# Patient Record
Sex: MALE | Race: WHITE | Employment: OTHER | URBAN - METROPOLITAN AREA
[De-identification: names, ages, dates, MRNs, and addresses within clinical notes are randomized per-mention and may not be internally consistent; named-entity substitution may affect disease eponyms.]

---

## 2017-04-18 ENCOUNTER — HOSPITAL ENCOUNTER (OUTPATIENT)
Age: 67
Discharge: HOME | End: 2017-04-18
Payer: MEDICARE

## 2017-04-18 LAB
ESTIMATED AVERAGE GLUCOSE: 114 MG/DL
HBA1C MFR BLD: 5.6 % (ref 4–6)

## 2017-04-18 PROCEDURE — 83036 HEMOGLOBIN GLYCOSYLATED A1C: CPT

## 2017-04-18 PROCEDURE — 36415 COLL VENOUS BLD VENIPUNCTURE: CPT

## 2017-05-11 ENCOUNTER — HOSPITAL ENCOUNTER (OUTPATIENT)
Dept: CT IMAGING | Age: 67
Discharge: HOME | End: 2017-05-11
Payer: MEDICARE

## 2017-05-11 DIAGNOSIS — R91.1 NODULE OF APEX OF RIGHT LUNG: ICD-10-CM

## 2017-05-11 PROCEDURE — 71250 CT THORAX DX C-: CPT

## 2017-05-20 ENCOUNTER — APPOINTMENT (OUTPATIENT)
Dept: CT IMAGING | Age: 67
End: 2017-05-20
Payer: MEDICARE

## 2017-05-20 ENCOUNTER — HOSPITAL ENCOUNTER (EMERGENCY)
Age: 67
Discharge: LEFT BEFORE DISCHARGE | End: 2017-05-20
Attending: EMERGENCY MEDICINE
Payer: MEDICARE

## 2017-05-20 ENCOUNTER — APPOINTMENT (OUTPATIENT)
Dept: GENERAL RADIOLOGY | Age: 67
End: 2017-05-20
Payer: MEDICARE

## 2017-05-20 VITALS
DIASTOLIC BLOOD PRESSURE: 74 MMHG | RESPIRATION RATE: 14 BRPM | HEART RATE: 85 BPM | SYSTOLIC BLOOD PRESSURE: 121 MMHG | TEMPERATURE: 97.9 F | OXYGEN SATURATION: 96 %

## 2017-05-20 DIAGNOSIS — R55 SYNCOPE AND COLLAPSE: Primary | ICD-10-CM

## 2017-05-20 DIAGNOSIS — F10.920 ACUTE ALCOHOLIC INTOXICATION, UNCOMPLICATED (HCC): ICD-10-CM

## 2017-05-20 LAB
ABSOLUTE EOS #: 0.2 K/UL (ref 0–0.4)
ABSOLUTE LYMPH #: 1.7 K/UL (ref 1–4.8)
ABSOLUTE MONO #: 0.9 K/UL (ref 0.1–1.2)
ACETAMINOPHEN LEVEL: <10 UG/ML (ref 10–30)
AMPHETAMINE SCREEN URINE: NEGATIVE
ANION GAP SERPL CALCULATED.3IONS-SCNC: 18 MMOL/L (ref 9–17)
BARBITURATE SCREEN URINE: NEGATIVE
BASOPHILS # BLD: 1 %
BASOPHILS ABSOLUTE: 0.1 K/UL (ref 0–0.2)
BENZODIAZEPINE SCREEN, URINE: NEGATIVE
BILIRUBIN URINE: NEGATIVE
BUN BLDV-MCNC: 7 MG/DL (ref 8–23)
BUN/CREAT BLD: ABNORMAL (ref 9–20)
BUPRENORPHINE URINE: NORMAL
CALCIUM SERPL-MCNC: 9.2 MG/DL (ref 8.6–10.4)
CANNABINOID SCREEN URINE: NEGATIVE
CHLORIDE BLD-SCNC: 97 MMOL/L (ref 98–107)
CO2: 17 MMOL/L (ref 20–31)
COCAINE METABOLITE, URINE: NEGATIVE
COLOR: YELLOW
COMMENT UA: ABNORMAL
CREAT SERPL-MCNC: 0.74 MG/DL (ref 0.7–1.2)
DIFFERENTIAL TYPE: ABNORMAL
EOSINOPHILS RELATIVE PERCENT: 2 %
ETHANOL PERCENT: 0.17 %
ETHANOL: 166 MG/DL
GFR AFRICAN AMERICAN: >60 ML/MIN
GFR NON-AFRICAN AMERICAN: >60 ML/MIN
GFR SERPL CREATININE-BSD FRML MDRD: ABNORMAL ML/MIN/{1.73_M2}
GFR SERPL CREATININE-BSD FRML MDRD: ABNORMAL ML/MIN/{1.73_M2}
GLUCOSE BLD-MCNC: 88 MG/DL (ref 70–99)
GLUCOSE URINE: NEGATIVE
HCT VFR BLD CALC: 49.8 % (ref 41–53)
HEMOGLOBIN: 17.3 G/DL (ref 13.5–17.5)
KETONES, URINE: NEGATIVE
LEUKOCYTE ESTERASE, URINE: NEGATIVE
LYMPHOCYTES # BLD: 15 %
MCH RBC QN AUTO: 31.9 PG (ref 26–34)
MCHC RBC AUTO-ENTMCNC: 34.7 G/DL (ref 31–37)
MCV RBC AUTO: 92 FL (ref 80–100)
MDMA URINE: NORMAL
METHADONE SCREEN, URINE: NEGATIVE
METHAMPHETAMINE, URINE: NORMAL
MONOCYTES # BLD: 8 %
NITRITE, URINE: NEGATIVE
OPIATES, URINE: NEGATIVE
OXYCODONE SCREEN URINE: NEGATIVE
PDW BLD-RTO: 15.3 % (ref 12.5–15.4)
PH UA: 5.5 (ref 5–8)
PHENCYCLIDINE, URINE: NEGATIVE
PLATELET # BLD: ABNORMAL K/UL (ref 140–450)
PLATELET ESTIMATE: ABNORMAL
PMV BLD AUTO: ABNORMAL FL (ref 6–12)
POC TROPONIN I: 0 NG/ML (ref 0–0.1)
POC TROPONIN INTERP: NORMAL
POTASSIUM SERPL-SCNC: 4.5 MMOL/L (ref 3.7–5.3)
PROPOXYPHENE, URINE: NORMAL
PROTEIN UA: NEGATIVE
RBC # BLD: 5.42 M/UL (ref 4.5–5.9)
RBC # BLD: ABNORMAL 10*6/UL
SALICYLATE LEVEL: <1 MG/DL (ref 3–10)
SEG NEUTROPHILS: 74 %
SEGMENTED NEUTROPHILS ABSOLUTE COUNT: 8.2 K/UL (ref 1.8–7.7)
SODIUM BLD-SCNC: 132 MMOL/L (ref 135–144)
SPECIFIC GRAVITY UA: 1 (ref 1–1.03)
TEST INFORMATION: NORMAL
TOXIC TRICYCLIC SC,BLOOD: NEGATIVE
TRICYCLIC ANTIDEPRESSANTS, UR: NORMAL
TURBIDITY: CLEAR
URINE HGB: NEGATIVE
UROBILINOGEN, URINE: NORMAL
WBC # BLD: 11.2 K/UL (ref 3.5–11)
WBC # BLD: ABNORMAL 10*3/UL

## 2017-05-20 PROCEDURE — 2580000003 HC RX 258: Performed by: EMERGENCY MEDICINE

## 2017-05-20 PROCEDURE — 85025 COMPLETE CBC W/AUTO DIFF WBC: CPT

## 2017-05-20 PROCEDURE — 93005 ELECTROCARDIOGRAM TRACING: CPT

## 2017-05-20 PROCEDURE — 81003 URINALYSIS AUTO W/O SCOPE: CPT

## 2017-05-20 PROCEDURE — 71020 XR CHEST STANDARD TWO VW: CPT

## 2017-05-20 PROCEDURE — 70450 CT HEAD/BRAIN W/O DYE: CPT

## 2017-05-20 PROCEDURE — 99285 EMERGENCY DEPT VISIT HI MDM: CPT

## 2017-05-20 PROCEDURE — 80048 BASIC METABOLIC PNL TOTAL CA: CPT

## 2017-05-20 PROCEDURE — G0480 DRUG TEST DEF 1-7 CLASSES: HCPCS

## 2017-05-20 PROCEDURE — 84484 ASSAY OF TROPONIN QUANT: CPT

## 2017-05-20 PROCEDURE — 80307 DRUG TEST PRSMV CHEM ANLYZR: CPT

## 2017-05-20 RX ORDER — LISINOPRIL 20 MG/1
30 TABLET ORAL DAILY
COMMUNITY
End: 2017-09-26 | Stop reason: SDUPTHER

## 2017-05-20 RX ORDER — 0.9 % SODIUM CHLORIDE 0.9 %
1000 INTRAVENOUS SOLUTION INTRAVENOUS ONCE
Status: COMPLETED | OUTPATIENT
Start: 2017-05-20 | End: 2017-05-20

## 2017-05-20 RX ADMIN — SODIUM CHLORIDE 1000 ML: 9 INJECTION, SOLUTION INTRAVENOUS at 19:24

## 2017-05-20 ASSESSMENT — ENCOUNTER SYMPTOMS
EYE DISCHARGE: 0
SHORTNESS OF BREATH: 0
VOMITING: 0
RHINORRHEA: 0
NAUSEA: 0
SORE THROAT: 0
ABDOMINAL PAIN: 0

## 2017-05-22 ENCOUNTER — HOSPITAL ENCOUNTER (OUTPATIENT)
Age: 67
Discharge: HOME | End: 2017-05-22
Payer: MEDICARE

## 2017-05-22 LAB
-: ABNORMAL
AMORPHOUS: ABNORMAL
BACTERIA: ABNORMAL
BILIRUBIN URINE: NEGATIVE
CASTS UA: ABNORMAL /LPF
COLOR: YELLOW
COMMENT UA: ABNORMAL
CRYSTALS, UA: ABNORMAL /HPF
EKG ATRIAL RATE: 91 BPM
EKG P AXIS: 43 DEGREES
EKG P-R INTERVAL: 130 MS
EKG Q-T INTERVAL: 358 MS
EKG QRS DURATION: 80 MS
EKG QTC CALCULATION (BAZETT): 440 MS
EKG R AXIS: -8 DEGREES
EKG T AXIS: 35 DEGREES
EKG VENTRICULAR RATE: 91 BPM
EPITHELIAL CELLS UA: ABNORMAL /HPF
GLUCOSE URINE: NEGATIVE
KETONES, URINE: NEGATIVE
LEUKOCYTE ESTERASE, URINE: NEGATIVE
MUCUS: ABNORMAL
NITRITE, URINE: NEGATIVE
OTHER OBSERVATIONS UA: ABNORMAL
PH UA: 5.5 (ref 5–8)
POC TROPONIN I: 0 NG/ML (ref 0–0.1)
POC TROPONIN INTERP: NORMAL
PROTEIN UA: NEGATIVE
RBC UA: ABNORMAL /HPF
RENAL EPITHELIAL, UA: ABNORMAL /HPF
SPECIFIC GRAVITY UA: 1.01 (ref 1–1.03)
TRICHOMONAS: ABNORMAL
TURBIDITY: CLEAR
URINE HGB: NEGATIVE
UROBILINOGEN, URINE: NORMAL
WBC UA: ABNORMAL /HPF
YEAST: ABNORMAL

## 2017-05-22 PROCEDURE — 81001 URINALYSIS AUTO W/SCOPE: CPT

## 2017-06-23 ENCOUNTER — HOSPITAL ENCOUNTER (OUTPATIENT)
Age: 67
Discharge: HOME | End: 2017-06-23
Payer: MEDICARE

## 2017-06-23 LAB
ANION GAP SERPL CALCULATED.3IONS-SCNC: 12 MMOL/L (ref 9–17)
BUN BLDV-MCNC: 11 MG/DL (ref 8–23)
BUN/CREAT BLD: ABNORMAL (ref 9–20)
CALCIUM SERPL-MCNC: 9.4 MG/DL (ref 8.6–10.4)
CHLORIDE BLD-SCNC: 101 MMOL/L (ref 98–107)
CO2: 24 MMOL/L (ref 20–31)
CREAT SERPL-MCNC: 0.91 MG/DL (ref 0.7–1.2)
GFR AFRICAN AMERICAN: >60 ML/MIN
GFR NON-AFRICAN AMERICAN: >60 ML/MIN
GFR SERPL CREATININE-BSD FRML MDRD: ABNORMAL ML/MIN/{1.73_M2}
GFR SERPL CREATININE-BSD FRML MDRD: ABNORMAL ML/MIN/{1.73_M2}
GLUCOSE BLD-MCNC: 109 MG/DL (ref 70–99)
POTASSIUM SERPL-SCNC: 4.9 MMOL/L (ref 3.7–5.3)
SODIUM BLD-SCNC: 137 MMOL/L (ref 135–144)

## 2017-06-23 PROCEDURE — 36415 COLL VENOUS BLD VENIPUNCTURE: CPT

## 2017-06-23 PROCEDURE — 80048 BASIC METABOLIC PNL TOTAL CA: CPT

## 2017-07-25 ENCOUNTER — HOSPITAL ENCOUNTER (OUTPATIENT)
Age: 67
Discharge: HOME | End: 2017-07-25
Payer: MEDICARE

## 2017-07-25 LAB
ANION GAP SERPL CALCULATED.3IONS-SCNC: 13 MMOL/L (ref 9–17)
BUN BLDV-MCNC: 10 MG/DL (ref 8–23)
BUN/CREAT BLD: ABNORMAL (ref 9–20)
CALCIUM SERPL-MCNC: 9.3 MG/DL (ref 8.6–10.4)
CHLORIDE BLD-SCNC: 102 MMOL/L (ref 98–107)
CO2: 24 MMOL/L (ref 20–31)
CREAT SERPL-MCNC: 0.8 MG/DL (ref 0.7–1.2)
GFR AFRICAN AMERICAN: >60 ML/MIN
GFR NON-AFRICAN AMERICAN: >60 ML/MIN
GFR SERPL CREATININE-BSD FRML MDRD: ABNORMAL ML/MIN/{1.73_M2}
GFR SERPL CREATININE-BSD FRML MDRD: ABNORMAL ML/MIN/{1.73_M2}
GLUCOSE BLD-MCNC: 126 MG/DL (ref 70–99)
POTASSIUM SERPL-SCNC: 5.1 MMOL/L (ref 3.7–5.3)
SODIUM BLD-SCNC: 139 MMOL/L (ref 135–144)

## 2017-07-25 PROCEDURE — 80048 BASIC METABOLIC PNL TOTAL CA: CPT

## 2017-07-25 PROCEDURE — 36415 COLL VENOUS BLD VENIPUNCTURE: CPT

## 2017-07-26 ENCOUNTER — HOSPITAL ENCOUNTER (OUTPATIENT)
Dept: VASCULAR LAB | Age: 67
Discharge: HOME | End: 2017-07-26
Payer: MEDICARE

## 2017-07-26 PROCEDURE — 93923 UPR/LXTR ART STDY 3+ LVLS: CPT

## 2017-08-11 ENCOUNTER — APPOINTMENT (OUTPATIENT)
Dept: CT IMAGING | Age: 67
DRG: 958 | End: 2017-08-11
Payer: MEDICARE

## 2017-08-11 ENCOUNTER — APPOINTMENT (OUTPATIENT)
Dept: GENERAL RADIOLOGY | Age: 67
DRG: 958 | End: 2017-08-11
Payer: MEDICARE

## 2017-08-11 ENCOUNTER — HOSPITAL ENCOUNTER (INPATIENT)
Age: 67
LOS: 5 days | Discharge: HOME HEALTH | DRG: 958 | End: 2017-08-17
Attending: EMERGENCY MEDICINE | Admitting: INTERNAL MEDICINE
Payer: MEDICARE

## 2017-08-11 DIAGNOSIS — S14.129A CENTRAL CORD SYNDROME, INITIAL ENCOUNTER (HCC): Primary | ICD-10-CM

## 2017-08-11 DIAGNOSIS — S01.01XA LACERATION OF SCALP WITHOUT FOREIGN BODY, INITIAL ENCOUNTER: ICD-10-CM
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
## 2017-08-11 DIAGNOSIS — F10.920 ALCOHOL INTOXICATION, UNCOMPLICATED (HCC): ICD-10-CM

## 2017-08-11 LAB
ABO/RH: NORMAL
ANION GAP SERPL CALCULATED.3IONS-SCNC: 20 MMOL/L (ref 9–17)
ANTIBODY SCREEN: NEGATIVE
ARM BAND NUMBER: NORMAL
BLOOD BANK SPECIMEN: ABNORMAL
BUN BLDV-MCNC: 11 MG/DL (ref 8–23)
CHLORIDE BLD-SCNC: 94 MMOL/L (ref 98–107)
CO2: 15 MMOL/L (ref 20–31)
CREAT SERPL-MCNC: 0.81 MG/DL (ref 0.7–1.2)
ETHANOL PERCENT: 0.27 %
ETHANOL: 268 MG/DL
EXPIRATION DATE: NORMAL
GFR AFRICAN AMERICAN: >60 ML/MIN
GFR NON-AFRICAN AMERICAN: >60 ML/MIN
GFR SERPL CREATININE-BSD FRML MDRD: ABNORMAL ML/MIN/{1.73_M2}
GFR SERPL CREATININE-BSD FRML MDRD: ABNORMAL ML/MIN/{1.73_M2}
GLUCOSE BLD-MCNC: 103 MG/DL (ref 70–99)
HCT VFR BLD CALC: 42.5 % (ref 41–53)
HEMOGLOBIN: 14.5 G/DL (ref 13.5–17.5)
INR BLD: 1
MCH RBC QN AUTO: 31.5 PG (ref 26–34)
MCHC RBC AUTO-ENTMCNC: 34.1 G/DL (ref 31–37)
MCV RBC AUTO: 92.4 FL (ref 80–100)
MYOGLOBIN: 378 NG/ML (ref 28–72)
PARTIAL THROMBOPLASTIN TIME: 24.3 SEC (ref 21.3–31.3)
PDW BLD-RTO: 14.2 % (ref 12.5–15.4)
PLATELET # BLD: 222 K/UL (ref 140–450)
PMV BLD AUTO: 7.9 FL (ref 6–12)
POTASSIUM SERPL-SCNC: 4.1 MMOL/L (ref 3.7–5.3)
PROTHROMBIN TIME: 10.7 SEC (ref 9.4–12.6)
RBC # BLD: 4.6 M/UL (ref 4.5–5.9)
SODIUM BLD-SCNC: 129 MMOL/L (ref 135–144)
TOTAL CK: 133 U/L (ref 39–308)
TROPONIN INTERP: ABNORMAL
TROPONIN T: <0.03 NG/ML
WBC # BLD: 10.4 K/UL (ref 3.5–11)

## 2017-08-11 PROCEDURE — 72170 X-RAY EXAM OF PELVIS: CPT

## 2017-08-11 PROCEDURE — 84484 ASSAY OF TROPONIN QUANT: CPT

## 2017-08-11 PROCEDURE — 99285 EMERGENCY DEPT VISIT HI MDM: CPT

## 2017-08-11 PROCEDURE — 72125 CT NECK SPINE W/O DYE: CPT

## 2017-08-11 PROCEDURE — 80307 DRUG TEST PRSMV CHEM ANLYZR: CPT

## 2017-08-11 PROCEDURE — 86850 RBC ANTIBODY SCREEN: CPT

## 2017-08-11 PROCEDURE — G0390 TRAUMA RESPONS W/HOSP CRITI: HCPCS

## 2017-08-11 PROCEDURE — 85730 THROMBOPLASTIN TIME PARTIAL: CPT

## 2017-08-11 PROCEDURE — 84520 ASSAY OF UREA NITROGEN: CPT

## 2017-08-11 PROCEDURE — 70450 CT HEAD/BRAIN W/O DYE: CPT

## 2017-08-11 PROCEDURE — 93005 ELECTROCARDIOGRAM TRACING: CPT

## 2017-08-11 PROCEDURE — 83874 ASSAY OF MYOGLOBIN: CPT

## 2017-08-11 PROCEDURE — 82550 ASSAY OF CK (CPK): CPT

## 2017-08-11 PROCEDURE — 82805 BLOOD GASES W/O2 SATURATION: CPT

## 2017-08-11 PROCEDURE — 85610 PROTHROMBIN TIME: CPT

## 2017-08-11 PROCEDURE — 72128 CT CHEST SPINE W/O DYE: CPT

## 2017-08-11 PROCEDURE — 85390 FIBRINOLYSINS SCREEN I&R: CPT

## 2017-08-11 PROCEDURE — 82565 ASSAY OF CREATININE: CPT

## 2017-08-11 PROCEDURE — 86901 BLOOD TYPING SEROLOGIC RH(D): CPT

## 2017-08-11 PROCEDURE — 72131 CT LUMBAR SPINE W/O DYE: CPT

## 2017-08-11 PROCEDURE — 82947 ASSAY GLUCOSE BLOOD QUANT: CPT

## 2017-08-11 PROCEDURE — 85027 COMPLETE CBC AUTOMATED: CPT

## 2017-08-11 PROCEDURE — G0480 DRUG TEST DEF 1-7 CLASSES: HCPCS

## 2017-08-11 PROCEDURE — 80051 ELECTROLYTE PANEL: CPT

## 2017-08-11 PROCEDURE — G0378 HOSPITAL OBSERVATION PER HR: HCPCS

## 2017-08-11 PROCEDURE — 71010 XR CHEST PORTABLE: CPT

## 2017-08-11 PROCEDURE — 86900 BLOOD TYPING SEROLOGIC ABO: CPT

## 2017-08-11 PROCEDURE — 85210 CLOT FACTOR II PROTHROM SPEC: CPT

## 2017-08-11 PROCEDURE — 36415 COLL VENOUS BLD VENIPUNCTURE: CPT

## 2017-08-12 ENCOUNTER — APPOINTMENT (OUTPATIENT)
Dept: MRI IMAGING | Age: 67
DRG: 958 | End: 2017-08-12
Payer: MEDICARE

## 2017-08-12 ENCOUNTER — APPOINTMENT (OUTPATIENT)
Dept: GENERAL RADIOLOGY | Age: 67
DRG: 958 | End: 2017-08-12
Payer: MEDICARE
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
## 2017-08-12 PROBLEM — R93.89 MEDIASTINAL WIDENING: Status: ACTIVE | Noted: 2017-08-12

## 2017-08-12 PROBLEM — S12.9XXA CERVICAL TRANSVERSE PROCESS FRACTURE (HCC): Status: ACTIVE | Noted: 2017-08-12

## 2017-08-12 PROBLEM — S00.01XA ABRASION OF SCALP: Status: ACTIVE | Noted: 2017-08-12

## 2017-08-12 PROBLEM — E87.1 HYPONATREMIA: Status: ACTIVE | Noted: 2017-08-12

## 2017-08-12 PROBLEM — F10.929 ALCOHOL INTOXICATION (HCC): Status: ACTIVE | Noted: 2017-08-12

## 2017-08-12 PROBLEM — I10 UNCONTROLLED HYPERTENSION: Status: ACTIVE | Noted: 2017-08-12

## 2017-08-12 LAB
ACT TEG: 97 SEC (ref 86–118)
ANGLE, RAPID TEG: 73.8 DEG (ref 64–80)
EPL-TEG: 1.6 % (ref 0–15)
ETHANOL PERCENT: 0.04 %
ETHANOL: 45 MG/DL
HEPARIN THERAPY: NORMAL
KINETICS RAPID TEG: 1.3 MIN (ref 1–2)
LY30 (LYSIS) TEG: 1.6 % (ref 0–8)
MA(MAX CLOT) RAPID TEG: 63.4 MM (ref 52–71)
REACTION TIME RAPID TEG: 0.5 MIN (ref 0–1)
TEG COMMENT: NORMAL
TROPONIN INTERP: NORMAL
TROPONIN INTERP: NORMAL
TROPONIN T: <0.03 NG/ML
TROPONIN T: <0.03 NG/ML

## 2017-08-12 PROCEDURE — 6360000002 HC RX W HCPCS: Performed by: PHYSICIAN ASSISTANT

## 2017-08-12 PROCEDURE — 6370000000 HC RX 637 (ALT 250 FOR IP): Performed by: FAMILY MEDICINE

## 2017-08-12 PROCEDURE — G0480 DRUG TEST DEF 1-7 CLASSES: HCPCS

## 2017-08-12 PROCEDURE — 6360000002 HC RX W HCPCS: Performed by: NURSE PRACTITIONER

## 2017-08-12 PROCEDURE — 94762 N-INVAS EAR/PLS OXIMTRY CONT: CPT

## 2017-08-12 PROCEDURE — 6360000002 HC RX W HCPCS

## 2017-08-12 PROCEDURE — 36415 COLL VENOUS BLD VENIPUNCTURE: CPT

## 2017-08-12 PROCEDURE — 2580000003 HC RX 258: Performed by: NURSE PRACTITIONER

## 2017-08-12 PROCEDURE — 96374 THER/PROPH/DIAG INJ IV PUSH: CPT

## 2017-08-12 PROCEDURE — 2580000003 HC RX 258: Performed by: EMERGENCY MEDICINE

## 2017-08-12 PROCEDURE — 6370000000 HC RX 637 (ALT 250 FOR IP): Performed by: NURSE PRACTITIONER

## 2017-08-12 PROCEDURE — 2060000000 HC ICU INTERMEDIATE R&B

## 2017-08-12 PROCEDURE — 93005 ELECTROCARDIOGRAM TRACING: CPT

## 2017-08-12 PROCEDURE — 99223 1ST HOSP IP/OBS HIGH 75: CPT | Performed by: NEUROLOGICAL SURGERY

## 2017-08-12 PROCEDURE — 99223 1ST HOSP IP/OBS HIGH 75: CPT | Performed by: FAMILY MEDICINE

## 2017-08-12 PROCEDURE — 84484 ASSAY OF TROPONIN QUANT: CPT

## 2017-08-12 PROCEDURE — 73090 X-RAY EXAM OF FOREARM: CPT

## 2017-08-12 PROCEDURE — 73030 X-RAY EXAM OF SHOULDER: CPT

## 2017-08-12 PROCEDURE — 72141 MRI NECK SPINE W/O DYE: CPT

## 2017-08-12 RX ORDER — SODIUM CHLORIDE 0.9 % (FLUSH) 0.9 %
10 SYRINGE (ML) INJECTION PRN
Status: DISCONTINUED | OUTPATIENT
Start: 2017-08-12 | End: 2017-08-17 | Stop reason: HOSPADM

## 2017-08-12 RX ORDER — LORAZEPAM 2 MG/ML
2 INJECTION INTRAMUSCULAR
Status: DISCONTINUED | OUTPATIENT
Start: 2017-08-12 | End: 2017-08-17 | Stop reason: HOSPADM

## 2017-08-12 RX ORDER — 0.9 % SODIUM CHLORIDE 0.9 %
250 INTRAVENOUS SOLUTION INTRAVENOUS ONCE
Status: DISCONTINUED | OUTPATIENT
Start: 2017-08-12 | End: 2017-08-12

## 2017-08-12 RX ORDER — SODIUM CHLORIDE 0.9 % (FLUSH) 0.9 %
10 SYRINGE (ML) INJECTION EVERY 12 HOURS SCHEDULED
Status: DISCONTINUED | OUTPATIENT
Start: 2017-08-12 | End: 2017-08-17 | Stop reason: HOSPADM

## 2017-08-12 RX ORDER — AMLODIPINE BESYLATE 10 MG/1
10 TABLET ORAL DAILY
COMMUNITY

## 2017-08-12 RX ORDER — LORAZEPAM 1 MG/1
2 TABLET ORAL
Status: DISCONTINUED | OUTPATIENT
Start: 2017-08-12 | End: 2017-08-17 | Stop reason: HOSPADM

## 2017-08-12 RX ORDER — LORAZEPAM 1 MG/1
1 TABLET ORAL
Status: DISCONTINUED | OUTPATIENT
Start: 2017-08-12 | End: 2017-08-17 | Stop reason: HOSPADM

## 2017-08-12 RX ORDER — LISINOPRIL 40 MG/1
40 TABLET ORAL DAILY
COMMUNITY

## 2017-08-12 RX ORDER — MORPHINE SULFATE 4 MG/ML
4 INJECTION, SOLUTION INTRAMUSCULAR; INTRAVENOUS
Status: DISCONTINUED | OUTPATIENT
Start: 2017-08-12 | End: 2017-08-14

## 2017-08-12 RX ORDER — TRAMADOL HYDROCHLORIDE 50 MG/1
100 TABLET ORAL EVERY 6 HOURS PRN
Status: DISCONTINUED | OUTPATIENT
Start: 2017-08-12 | End: 2017-08-14

## 2017-08-12 RX ORDER — AMLODIPINE BESYLATE 10 MG/1
10 TABLET ORAL DAILY
Status: DISCONTINUED | OUTPATIENT
Start: 2017-08-12 | End: 2017-08-17 | Stop reason: HOSPADM

## 2017-08-12 RX ORDER — FAMOTIDINE 20 MG/1
20 TABLET, FILM COATED ORAL 2 TIMES DAILY
Status: DISCONTINUED | OUTPATIENT
Start: 2017-08-12 | End: 2017-08-17 | Stop reason: HOSPADM

## 2017-08-12 RX ORDER — LORAZEPAM 1 MG/1
3 TABLET ORAL
Status: DISCONTINUED | OUTPATIENT
Start: 2017-08-12 | End: 2017-08-17 | Stop reason: HOSPADM

## 2017-08-12 RX ORDER — DOCUSATE SODIUM 100 MG/1
100 CAPSULE, LIQUID FILLED ORAL 2 TIMES DAILY
Status: DISCONTINUED | OUTPATIENT
Start: 2017-08-12 | End: 2017-08-17 | Stop reason: HOSPADM

## 2017-08-12 RX ORDER — TRAMADOL HYDROCHLORIDE 50 MG/1
50 TABLET ORAL EVERY 6 HOURS PRN
Status: DISCONTINUED | OUTPATIENT
Start: 2017-08-12 | End: 2017-08-14

## 2017-08-12 RX ORDER — LORAZEPAM 1 MG/1
4 TABLET ORAL
Status: DISCONTINUED | OUTPATIENT
Start: 2017-08-12 | End: 2017-08-17 | Stop reason: HOSPADM

## 2017-08-12 RX ORDER — LORAZEPAM 2 MG/ML
3 INJECTION INTRAMUSCULAR
Status: DISCONTINUED | OUTPATIENT
Start: 2017-08-12 | End: 2017-08-17 | Stop reason: HOSPADM

## 2017-08-12 RX ORDER — LORAZEPAM 2 MG/ML
4 INJECTION INTRAMUSCULAR
Status: DISCONTINUED | OUTPATIENT
Start: 2017-08-12 | End: 2017-08-17 | Stop reason: HOSPADM

## 2017-08-12 RX ORDER — BISACODYL 10 MG
10 SUPPOSITORY, RECTAL RECTAL DAILY PRN
Status: DISCONTINUED | OUTPATIENT
Start: 2017-08-12 | End: 2017-08-17 | Stop reason: HOSPADM

## 2017-08-12 RX ORDER — FENTANYL CITRATE 50 UG/ML
INJECTION, SOLUTION INTRAMUSCULAR; INTRAVENOUS
Status: COMPLETED
Start: 2017-08-12 | End: 2017-08-12

## 2017-08-12 RX ORDER — MAGNESIUM SULFATE 1 G/100ML
1 INJECTION INTRAVENOUS PRN
Status: DISCONTINUED | OUTPATIENT
Start: 2017-08-12 | End: 2017-08-17 | Stop reason: HOSPADM

## 2017-08-12 RX ORDER — POTASSIUM CHLORIDE 20MEQ/15ML
40 LIQUID (ML) ORAL PRN
Status: DISCONTINUED | OUTPATIENT
Start: 2017-08-12 | End: 2017-08-17 | Stop reason: HOSPADM

## 2017-08-12 RX ORDER — ACETAMINOPHEN 325 MG/1
650 TABLET ORAL EVERY 4 HOURS PRN
Status: DISCONTINUED | OUTPATIENT
Start: 2017-08-12 | End: 2017-08-17 | Stop reason: HOSPADM

## 2017-08-12 RX ORDER — POTASSIUM CHLORIDE 20 MEQ/1
40 TABLET, EXTENDED RELEASE ORAL PRN
Status: DISCONTINUED | OUTPATIENT
Start: 2017-08-12 | End: 2017-08-17 | Stop reason: HOSPADM

## 2017-08-12 RX ORDER — MORPHINE SULFATE 2 MG/ML
2 INJECTION, SOLUTION INTRAMUSCULAR; INTRAVENOUS
Status: DISCONTINUED | OUTPATIENT
Start: 2017-08-12 | End: 2017-08-14

## 2017-08-12 RX ORDER — DEXAMETHASONE SODIUM PHOSPHATE 4 MG/ML
4 INJECTION, SOLUTION INTRA-ARTICULAR; INTRALESIONAL; INTRAMUSCULAR; INTRAVENOUS; SOFT TISSUE EVERY 6 HOURS
Status: DISCONTINUED | OUTPATIENT
Start: 2017-08-12 | End: 2017-08-16

## 2017-08-12 RX ORDER — POTASSIUM CHLORIDE 7.45 MG/ML
10 INJECTION INTRAVENOUS PRN
Status: DISCONTINUED | OUTPATIENT
Start: 2017-08-12 | End: 2017-08-17 | Stop reason: HOSPADM

## 2017-08-12 RX ORDER — LISINOPRIL 20 MG/1
40 TABLET ORAL DAILY
Status: DISCONTINUED | OUTPATIENT
Start: 2017-08-12 | End: 2017-08-17 | Stop reason: HOSPADM

## 2017-08-12 RX ORDER — NICOTINE 21 MG/24HR
1 PATCH, TRANSDERMAL 24 HOURS TRANSDERMAL DAILY
Status: DISCONTINUED | OUTPATIENT
Start: 2017-08-12 | End: 2017-08-17 | Stop reason: HOSPADM

## 2017-08-12 RX ORDER — LORAZEPAM 2 MG/ML
1 INJECTION INTRAMUSCULAR
Status: DISCONTINUED | OUTPATIENT
Start: 2017-08-12 | End: 2017-08-17 | Stop reason: HOSPADM

## 2017-08-12 RX ORDER — NITROGLYCERIN 0.4 MG/1
0.4 TABLET SUBLINGUAL EVERY 5 MIN PRN
Status: DISCONTINUED | OUTPATIENT
Start: 2017-08-12 | End: 2017-08-17 | Stop reason: HOSPADM

## 2017-08-12 RX ORDER — ONDANSETRON 2 MG/ML
4 INJECTION INTRAMUSCULAR; INTRAVENOUS EVERY 6 HOURS PRN
Status: DISCONTINUED | OUTPATIENT
Start: 2017-08-12 | End: 2017-08-17 | Stop reason: HOSPADM

## 2017-08-12 RX ORDER — LABETALOL HYDROCHLORIDE 5 MG/ML
20 INJECTION, SOLUTION INTRAVENOUS EVERY 4 HOURS PRN
Status: DISCONTINUED | OUTPATIENT
Start: 2017-08-12 | End: 2017-08-17 | Stop reason: HOSPADM

## 2017-08-12 RX ORDER — SODIUM CHLORIDE 9 MG/ML
INJECTION, SOLUTION INTRAVENOUS CONTINUOUS
Status: DISCONTINUED | OUTPATIENT
Start: 2017-08-12 | End: 2017-08-15

## 2017-08-12 RX ADMIN — LORAZEPAM 2 MG: 2 INJECTION INTRAMUSCULAR; INTRAVENOUS at 23:14

## 2017-08-12 RX ADMIN — LORAZEPAM 1 MG: 2 INJECTION INTRAMUSCULAR; INTRAVENOUS at 21:36

## 2017-08-12 RX ADMIN — Medication 10 ML: at 23:16

## 2017-08-12 RX ADMIN — SODIUM CHLORIDE: 9 INJECTION, SOLUTION INTRAVENOUS at 21:36

## 2017-08-12 RX ADMIN — TRAMADOL HYDROCHLORIDE 100 MG: 50 TABLET, FILM COATED ORAL at 20:26

## 2017-08-12 RX ADMIN — LORAZEPAM 2 MG: 2 INJECTION INTRAMUSCULAR; INTRAVENOUS at 20:26

## 2017-08-12 RX ADMIN — DEXAMETHASONE SODIUM PHOSPHATE 4 MG: 4 INJECTION, SOLUTION INTRAMUSCULAR; INTRAVENOUS at 20:19

## 2017-08-12 RX ADMIN — FAMOTIDINE 20 MG: 20 TABLET, FILM COATED ORAL at 20:19

## 2017-08-12 RX ADMIN — MORPHINE SULFATE 4 MG: 4 INJECTION, SOLUTION INTRAMUSCULAR; INTRAVENOUS at 17:46

## 2017-08-12 RX ADMIN — DOCUSATE SODIUM 100 MG: 100 CAPSULE ORAL at 20:19

## 2017-08-12 RX ADMIN — ENOXAPARIN SODIUM 40 MG: 40 INJECTION SUBCUTANEOUS at 08:36

## 2017-08-12 RX ADMIN — MORPHINE SULFATE 2 MG: 2 INJECTION, SOLUTION INTRAMUSCULAR; INTRAVENOUS at 10:59

## 2017-08-12 RX ADMIN — Medication 10 ML: at 20:19

## 2017-08-12 RX ADMIN — FENTANYL CITRATE 100 MCG: 50 INJECTION INTRAMUSCULAR; INTRAVENOUS at 00:07

## 2017-08-12 RX ADMIN — MORPHINE SULFATE 2 MG: 2 INJECTION, SOLUTION INTRAMUSCULAR; INTRAVENOUS at 08:34

## 2017-08-12 RX ADMIN — MORPHINE SULFATE 4 MG: 4 INJECTION, SOLUTION INTRAMUSCULAR; INTRAVENOUS at 13:36

## 2017-08-12 RX ADMIN — ONDANSETRON 4 MG: 2 INJECTION INTRAMUSCULAR; INTRAVENOUS at 08:34

## 2017-08-12 RX ADMIN — DOCUSATE SODIUM 100 MG: 100 CAPSULE ORAL at 08:36

## 2017-08-12 RX ADMIN — AMLODIPINE BESYLATE 10 MG: 10 TABLET ORAL at 12:20

## 2017-08-12 RX ADMIN — FAMOTIDINE 20 MG: 20 TABLET, FILM COATED ORAL at 08:36

## 2017-08-12 RX ADMIN — TRAMADOL HYDROCHLORIDE 100 MG: 50 TABLET, FILM COATED ORAL at 05:06

## 2017-08-12 RX ADMIN — DEXAMETHASONE SODIUM PHOSPHATE 4 MG: 4 INJECTION, SOLUTION INTRAMUSCULAR; INTRAVENOUS at 15:54

## 2017-08-12 RX ADMIN — LISINOPRIL 40 MG: 20 TABLET ORAL at 12:20

## 2017-08-12 RX ADMIN — ONDANSETRON 4 MG: 2 INJECTION INTRAMUSCULAR; INTRAVENOUS at 18:44

## 2017-08-12 RX ADMIN — SODIUM CHLORIDE: 9 INJECTION, SOLUTION INTRAVENOUS at 04:48

## 2017-08-12 ASSESSMENT — PAIN DESCRIPTION - PAIN TYPE
TYPE: ACUTE PAIN

## 2017-08-12 ASSESSMENT — PAIN SCALES - GENERAL
PAINLEVEL_OUTOF10: 9
PAINLEVEL_OUTOF10: 8
PAINLEVEL_OUTOF10: 6
PAINLEVEL_OUTOF10: 8
PAINLEVEL_OUTOF10: 8
PAINLEVEL_OUTOF10: 3
PAINLEVEL_OUTOF10: 9
PAINLEVEL_OUTOF10: 6

## 2017-08-12 ASSESSMENT — ENCOUNTER SYMPTOMS
BLOOD IN STOOL: 0
BACK PAIN: 0
SORE THROAT: 0
SINUS PRESSURE: 0
RECTAL PAIN: 0
COUGH: 0
ABDOMINAL PAIN: 0
VOMITING: 0
NAUSEA: 0
SHORTNESS OF BREATH: 0
DIARRHEA: 0
CHEST TIGHTNESS: 0
EYE PAIN: 0
CONSTIPATION: 0
WHEEZING: 0

## 2017-08-12 ASSESSMENT — PAIN DESCRIPTION - DESCRIPTORS: DESCRIPTORS: SHARP

## 2017-08-12 ASSESSMENT — PAIN DESCRIPTION - ORIENTATION
ORIENTATION: LEFT
ORIENTATION: LEFT

## 2017-08-12 ASSESSMENT — PAIN DESCRIPTION - LOCATION
LOCATION: ARM;BACK
LOCATION: ARM;CHEST
LOCATION: ARM;CHEST

## 2017-08-13 ENCOUNTER — ANESTHESIA EVENT (OUTPATIENT)
Dept: OPERATING ROOM | Age: 67
DRG: 958 | End: 2017-08-13
Payer: MEDICARE

## 2017-08-13 ENCOUNTER — APPOINTMENT (OUTPATIENT)
Dept: CT IMAGING | Age: 67
DRG: 958 | End: 2017-08-13
Payer: MEDICARE

## 2017-08-13 LAB
ABSOLUTE EOS #: 0 K/UL (ref 0–0.4)
ABSOLUTE LYMPH #: 0.36 K/UL (ref 1–4.8)
ABSOLUTE MONO #: 0.36 K/UL (ref 0.1–1.2)
ALBUMIN SERPL-MCNC: 3.9 G/DL (ref 3.5–5.2)
ALBUMIN/GLOBULIN RATIO: 1.2 (ref 1–2.5)
ALP BLD-CCNC: 57 U/L (ref 40–129)
ALT SERPL-CCNC: 14 U/L (ref 5–41)
ANION GAP SERPL CALCULATED.3IONS-SCNC: 16 MMOL/L (ref 9–17)
AST SERPL-CCNC: 17 U/L
BASOPHILS # BLD: 0 %
BASOPHILS ABSOLUTE: 0 K/UL (ref 0–0.2)
BILIRUB SERPL-MCNC: 0.65 MG/DL (ref 0.3–1.2)
BUN BLDV-MCNC: 9 MG/DL (ref 8–23)
BUN/CREAT BLD: ABNORMAL (ref 9–20)
CALCIUM SERPL-MCNC: 8.6 MG/DL (ref 8.6–10.4)
CHLORIDE BLD-SCNC: 99 MMOL/L (ref 98–107)
CHOLESTEROL/HDL RATIO: 1.9
CHOLESTEROL: 144 MG/DL
CO2: 19 MMOL/L (ref 20–31)
CREAT SERPL-MCNC: 0.62 MG/DL (ref 0.7–1.2)
DIFFERENTIAL TYPE: ABNORMAL
EKG ATRIAL RATE: 58 BPM
EKG ATRIAL RATE: 70 BPM
EKG ATRIAL RATE: 78 BPM
EKG P AXIS: 41 DEGREES
EKG P AXIS: 52 DEGREES
EKG P AXIS: 63 DEGREES
EKG P-R INTERVAL: 132 MS
EKG P-R INTERVAL: 140 MS
EKG P-R INTERVAL: 140 MS
EKG Q-T INTERVAL: 396 MS
EKG Q-T INTERVAL: 418 MS
EKG Q-T INTERVAL: 444 MS
EKG QRS DURATION: 82 MS
EKG QRS DURATION: 84 MS
EKG QRS DURATION: 94 MS
EKG QTC CALCULATION (BAZETT): 435 MS
EKG QTC CALCULATION (BAZETT): 451 MS
EKG QTC CALCULATION (BAZETT): 451 MS
EKG R AXIS: 58 DEGREES
EKG R AXIS: 59 DEGREES
EKG R AXIS: 62 DEGREES
EKG T AXIS: 54 DEGREES
EKG T AXIS: 58 DEGREES
EKG T AXIS: 58 DEGREES
EKG VENTRICULAR RATE: 58 BPM
EKG VENTRICULAR RATE: 70 BPM
EKG VENTRICULAR RATE: 78 BPM
EOSINOPHILS RELATIVE PERCENT: 0 %
ESTIMATED AVERAGE GLUCOSE: 105 MG/DL
GFR AFRICAN AMERICAN: >60 ML/MIN
GFR NON-AFRICAN AMERICAN: >60 ML/MIN
GFR SERPL CREATININE-BSD FRML MDRD: ABNORMAL ML/MIN/{1.73_M2}
GFR SERPL CREATININE-BSD FRML MDRD: ABNORMAL ML/MIN/{1.73_M2}
GLUCOSE BLD-MCNC: 125 MG/DL (ref 70–99)
HBA1C MFR BLD: 5.3 % (ref 4–6)
HCT VFR BLD CALC: 49.7 % (ref 41–53)
HDLC SERPL-MCNC: 75 MG/DL
HEMOGLOBIN: 16.3 G/DL (ref 13.5–17.5)
LDL CHOLESTEROL: 55 MG/DL (ref 0–130)
LYMPHOCYTES # BLD: 4 %
MAGNESIUM: 2.3 MG/DL (ref 1.6–2.6)
MCH RBC QN AUTO: 31 PG (ref 26–34)
MCHC RBC AUTO-ENTMCNC: 32.8 G/DL (ref 31–37)
MCV RBC AUTO: 94.5 FL (ref 80–100)
MONOCYTES # BLD: 4 %
MORPHOLOGY: NORMAL
PDW BLD-RTO: 15.3 % (ref 12.5–15.4)
PHOSPHORUS: 3.1 MG/DL (ref 2.5–4.5)
PLATELET # BLD: 165 K/UL (ref 140–450)
PLATELET ESTIMATE: ABNORMAL
PMV BLD AUTO: 8.1 FL (ref 6–12)
POTASSIUM SERPL-SCNC: 4.8 MMOL/L (ref 3.7–5.3)
RBC # BLD: 5.26 M/UL (ref 4.5–5.9)
RBC # BLD: ABNORMAL 10*6/UL
SEG NEUTROPHILS: 92 %
SEGMENTED NEUTROPHILS ABSOLUTE COUNT: 8.38 K/UL (ref 1.8–7.7)
SODIUM BLD-SCNC: 134 MMOL/L (ref 135–144)
T3 FREE: 2.6 PG/ML (ref 2.02–4.43)
THYROXINE, FREE: 1.38 NG/DL (ref 0.93–1.7)
TOTAL PROTEIN: 7.1 G/DL (ref 6.4–8.3)
TRIGL SERPL-MCNC: 70 MG/DL
TSH SERPL DL<=0.05 MIU/L-ACNC: 0.15 MIU/L (ref 0.3–5)
VLDLC SERPL CALC-MCNC: NORMAL MG/DL (ref 1–30)
WBC # BLD: 9.1 K/UL (ref 3.5–11)
WBC # BLD: ABNORMAL 10*3/UL

## 2017-08-13 PROCEDURE — 80061 LIPID PANEL: CPT

## 2017-08-13 PROCEDURE — 36415 COLL VENOUS BLD VENIPUNCTURE: CPT

## 2017-08-13 PROCEDURE — 71260 CT THORAX DX C+: CPT

## 2017-08-13 PROCEDURE — 99233 SBSQ HOSP IP/OBS HIGH 50: CPT | Performed by: FAMILY MEDICINE

## 2017-08-13 PROCEDURE — 6360000002 HC RX W HCPCS: Performed by: NURSE PRACTITIONER

## 2017-08-13 PROCEDURE — 84439 ASSAY OF FREE THYROXINE: CPT

## 2017-08-13 PROCEDURE — 84443 ASSAY THYROID STIM HORMONE: CPT

## 2017-08-13 PROCEDURE — 83036 HEMOGLOBIN GLYCOSYLATED A1C: CPT

## 2017-08-13 PROCEDURE — 74177 CT ABD & PELVIS W/CONTRAST: CPT

## 2017-08-13 PROCEDURE — 94640 AIRWAY INHALATION TREATMENT: CPT

## 2017-08-13 PROCEDURE — 6370000000 HC RX 637 (ALT 250 FOR IP): Performed by: NURSE PRACTITIONER

## 2017-08-13 PROCEDURE — 84100 ASSAY OF PHOSPHORUS: CPT

## 2017-08-13 PROCEDURE — 6360000002 HC RX W HCPCS: Performed by: PHYSICIAN ASSISTANT

## 2017-08-13 PROCEDURE — 6360000004 HC RX CONTRAST MEDICATION: Performed by: FAMILY MEDICINE

## 2017-08-13 PROCEDURE — 99231 SBSQ HOSP IP/OBS SF/LOW 25: CPT | Performed by: NEUROLOGICAL SURGERY

## 2017-08-13 PROCEDURE — 84481 FREE ASSAY (FT-3): CPT

## 2017-08-13 PROCEDURE — 99222 1ST HOSP IP/OBS MODERATE 55: CPT | Performed by: INTERNAL MEDICINE

## 2017-08-13 PROCEDURE — 6370000000 HC RX 637 (ALT 250 FOR IP): Performed by: INTERNAL MEDICINE

## 2017-08-13 PROCEDURE — 6370000000 HC RX 637 (ALT 250 FOR IP): Performed by: FAMILY MEDICINE

## 2017-08-13 PROCEDURE — 2060000000 HC ICU INTERMEDIATE R&B

## 2017-08-13 PROCEDURE — 94762 N-INVAS EAR/PLS OXIMTRY CONT: CPT

## 2017-08-13 PROCEDURE — 80053 COMPREHEN METABOLIC PANEL: CPT

## 2017-08-13 PROCEDURE — 2580000003 HC RX 258: Performed by: NURSE PRACTITIONER

## 2017-08-13 PROCEDURE — 85025 COMPLETE CBC W/AUTO DIFF WBC: CPT

## 2017-08-13 PROCEDURE — 83735 ASSAY OF MAGNESIUM: CPT

## 2017-08-13 RX ORDER — ALBUTEROL SULFATE 90 UG/1
2 AEROSOL, METERED RESPIRATORY (INHALATION) 4 TIMES DAILY
Status: DISCONTINUED | OUTPATIENT
Start: 2017-08-13 | End: 2017-08-17

## 2017-08-13 RX ADMIN — SODIUM CHLORIDE: 9 INJECTION, SOLUTION INTRAVENOUS at 04:53

## 2017-08-13 RX ADMIN — FAMOTIDINE 20 MG: 20 TABLET, FILM COATED ORAL at 10:04

## 2017-08-13 RX ADMIN — LISINOPRIL 40 MG: 20 TABLET ORAL at 10:04

## 2017-08-13 RX ADMIN — TRAMADOL HYDROCHLORIDE 100 MG: 50 TABLET, FILM COATED ORAL at 10:04

## 2017-08-13 RX ADMIN — DEXAMETHASONE SODIUM PHOSPHATE 4 MG: 4 INJECTION, SOLUTION INTRAMUSCULAR; INTRAVENOUS at 15:22

## 2017-08-13 RX ADMIN — AMLODIPINE BESYLATE 10 MG: 10 TABLET ORAL at 10:04

## 2017-08-13 RX ADMIN — ALBUTEROL SULFATE 2 PUFF: 90 AEROSOL, METERED RESPIRATORY (INHALATION) at 20:46

## 2017-08-13 RX ADMIN — DEXAMETHASONE SODIUM PHOSPHATE 4 MG: 4 INJECTION, SOLUTION INTRAMUSCULAR; INTRAVENOUS at 10:04

## 2017-08-13 RX ADMIN — IPRATROPIUM BROMIDE 2 PUFF: 17 AEROSOL, METERED RESPIRATORY (INHALATION) at 20:46

## 2017-08-13 RX ADMIN — MORPHINE SULFATE 4 MG: 4 INJECTION, SOLUTION INTRAMUSCULAR; INTRAVENOUS at 15:34

## 2017-08-13 RX ADMIN — MORPHINE SULFATE 4 MG: 4 INJECTION, SOLUTION INTRAMUSCULAR; INTRAVENOUS at 19:29

## 2017-08-13 RX ADMIN — MORPHINE SULFATE 4 MG: 4 INJECTION, SOLUTION INTRAMUSCULAR; INTRAVENOUS at 22:19

## 2017-08-13 RX ADMIN — FAMOTIDINE 20 MG: 20 TABLET, FILM COATED ORAL at 20:05

## 2017-08-13 RX ADMIN — LORAZEPAM 1 MG: 2 INJECTION INTRAMUSCULAR; INTRAVENOUS at 02:46

## 2017-08-13 RX ADMIN — Medication 10 ML: at 20:06

## 2017-08-13 RX ADMIN — ENOXAPARIN SODIUM 40 MG: 40 INJECTION SUBCUTANEOUS at 10:04

## 2017-08-13 RX ADMIN — TRAMADOL HYDROCHLORIDE 100 MG: 50 TABLET, FILM COATED ORAL at 23:56

## 2017-08-13 RX ADMIN — DEXAMETHASONE SODIUM PHOSPHATE 4 MG: 4 INJECTION, SOLUTION INTRAMUSCULAR; INTRAVENOUS at 02:34

## 2017-08-13 RX ADMIN — DOCUSATE SODIUM 100 MG: 100 CAPSULE ORAL at 20:05

## 2017-08-13 RX ADMIN — IOVERSOL 130 ML: 741 INJECTION INTRA-ARTERIAL; INTRAVENOUS at 12:47

## 2017-08-13 RX ADMIN — MORPHINE SULFATE 4 MG: 4 INJECTION, SOLUTION INTRAMUSCULAR; INTRAVENOUS at 13:19

## 2017-08-13 RX ADMIN — DEXAMETHASONE SODIUM PHOSPHATE 4 MG: 4 INJECTION, SOLUTION INTRAMUSCULAR; INTRAVENOUS at 20:05

## 2017-08-13 RX ADMIN — DOCUSATE SODIUM 100 MG: 100 CAPSULE ORAL at 10:04

## 2017-08-13 RX ADMIN — TRAMADOL HYDROCHLORIDE 100 MG: 50 TABLET, FILM COATED ORAL at 18:08

## 2017-08-13 ASSESSMENT — PAIN SCALES - GENERAL
PAINLEVEL_OUTOF10: 8
PAINLEVEL_OUTOF10: 4
PAINLEVEL_OUTOF10: 8
PAINLEVEL_OUTOF10: 4
PAINLEVEL_OUTOF10: 8
PAINLEVEL_OUTOF10: 7
PAINLEVEL_OUTOF10: 7

## 2017-08-13 ASSESSMENT — ENCOUNTER SYMPTOMS
WHEEZING: 0
VOMITING: 0
DIARRHEA: 0
SHORTNESS OF BREATH: 0
ABDOMINAL PAIN: 1
CONSTIPATION: 0
NAUSEA: 0

## 2017-08-14 ENCOUNTER — ANESTHESIA (OUTPATIENT)
Dept: OPERATING ROOM | Age: 67
DRG: 958 | End: 2017-08-14
Payer: MEDICARE

## 2017-08-14 ENCOUNTER — APPOINTMENT (OUTPATIENT)
Dept: GENERAL RADIOLOGY | Age: 67
DRG: 958 | End: 2017-08-14
Payer: MEDICARE

## 2017-08-14 VITALS — OXYGEN SATURATION: 100 % | DIASTOLIC BLOOD PRESSURE: 83 MMHG | TEMPERATURE: 95.3 F | SYSTOLIC BLOOD PRESSURE: 138 MMHG

## 2017-08-14 LAB
ABSOLUTE EOS #: 0 K/UL (ref 0–0.4)
ABSOLUTE LYMPH #: 0.88 K/UL (ref 1–4.8)
ABSOLUTE MONO #: 0.63 K/UL (ref 0.1–0.8)
ANION GAP SERPL CALCULATED.3IONS-SCNC: 12 MMOL/L (ref 9–17)
BASOPHILS # BLD: 0 %
BASOPHILS ABSOLUTE: 0 K/UL (ref 0–0.2)
BUN BLDV-MCNC: 9 MG/DL (ref 8–23)
BUN/CREAT BLD: ABNORMAL (ref 9–20)
CALCIUM SERPL-MCNC: 8.5 MG/DL (ref 8.6–10.4)
CHLORIDE BLD-SCNC: 104 MMOL/L (ref 98–107)
CO2: 21 MMOL/L (ref 20–31)
CREAT SERPL-MCNC: 0.59 MG/DL (ref 0.7–1.2)
DIFFERENTIAL TYPE: ABNORMAL
EKG ATRIAL RATE: 83 BPM
EKG P AXIS: 60 DEGREES
EKG P-R INTERVAL: 136 MS
EKG Q-T INTERVAL: 388 MS
EKG QRS DURATION: 84 MS
EKG QTC CALCULATION (BAZETT): 455 MS
EKG R AXIS: 56 DEGREES
EKG T AXIS: 49 DEGREES
EKG VENTRICULAR RATE: 83 BPM
EOSINOPHILS RELATIVE PERCENT: 0 %
GFR AFRICAN AMERICAN: >60 ML/MIN
GFR NON-AFRICAN AMERICAN: >60 ML/MIN
GFR SERPL CREATININE-BSD FRML MDRD: ABNORMAL ML/MIN/{1.73_M2}
GFR SERPL CREATININE-BSD FRML MDRD: ABNORMAL ML/MIN/{1.73_M2}
GLUCOSE BLD-MCNC: 143 MG/DL (ref 70–99)
HCT VFR BLD CALC: 43.3 % (ref 41–53)
HEMOGLOBIN: 14.6 G/DL (ref 13.5–17.5)
LV EF: 55 %
LVEF MODALITY: NORMAL
LYMPHOCYTES # BLD: 7 %
MCH RBC QN AUTO: 31 PG (ref 26–34)
MCHC RBC AUTO-ENTMCNC: 33.6 G/DL (ref 31–37)
MCV RBC AUTO: 92.1 FL (ref 80–100)
MONOCYTES # BLD: 5 %
MORPHOLOGY: NORMAL
PDW BLD-RTO: 15 % (ref 12.5–15.4)
PLATELET # BLD: 235 K/UL (ref 140–450)
PLATELET ESTIMATE: ABNORMAL
PMV BLD AUTO: 8 FL (ref 6–12)
POTASSIUM SERPL-SCNC: 4.5 MMOL/L (ref 3.7–5.3)
RBC # BLD: 4.71 M/UL (ref 4.5–5.9)
RBC # BLD: ABNORMAL 10*6/UL
SEG NEUTROPHILS: 88 %
SEGMENTED NEUTROPHILS ABSOLUTE COUNT: 10.99 K/UL (ref 1.8–7.7)
SODIUM BLD-SCNC: 137 MMOL/L (ref 135–144)
WBC # BLD: 12.5 K/UL (ref 3.5–11)
WBC # BLD: ABNORMAL 10*3/UL

## 2017-08-14 PROCEDURE — 2580000003 HC RX 258: Performed by: NURSE PRACTITIONER

## 2017-08-14 PROCEDURE — 7100000001 HC PACU RECOVERY - ADDTL 15 MIN: Performed by: NEUROLOGICAL SURGERY

## 2017-08-14 PROCEDURE — 2500000003 HC RX 250 WO HCPCS: Performed by: NEUROLOGICAL SURGERY

## 2017-08-14 PROCEDURE — 6370000000 HC RX 637 (ALT 250 FOR IP): Performed by: NEUROLOGICAL SURGERY

## 2017-08-14 PROCEDURE — 99233 SBSQ HOSP IP/OBS HIGH 50: CPT | Performed by: FAMILY MEDICINE

## 2017-08-14 PROCEDURE — 2060000000 HC ICU INTERMEDIATE R&B

## 2017-08-14 PROCEDURE — 6360000002 HC RX W HCPCS: Performed by: PHYSICIAN ASSISTANT

## 2017-08-14 PROCEDURE — 36415 COLL VENOUS BLD VENIPUNCTURE: CPT

## 2017-08-14 PROCEDURE — 3700000001 HC ADD 15 MINUTES (ANESTHESIA): Performed by: NEUROLOGICAL SURGERY

## 2017-08-14 PROCEDURE — 72040 X-RAY EXAM NECK SPINE 2-3 VW: CPT

## 2017-08-14 PROCEDURE — A4364 ADHESIVE, LIQUID OR EQUAL: HCPCS | Performed by: NEUROLOGICAL SURGERY

## 2017-08-14 PROCEDURE — 7100000000 HC PACU RECOVERY - FIRST 15 MIN: Performed by: NEUROLOGICAL SURGERY

## 2017-08-14 PROCEDURE — 93306 TTE W/DOPPLER COMPLETE: CPT

## 2017-08-14 PROCEDURE — 80048 BASIC METABOLIC PNL TOTAL CA: CPT

## 2017-08-14 PROCEDURE — 6360000002 HC RX W HCPCS: Performed by: NURSE ANESTHETIST, CERTIFIED REGISTERED

## 2017-08-14 PROCEDURE — 6360000002 HC RX W HCPCS: Performed by: NEUROLOGICAL SURGERY

## 2017-08-14 PROCEDURE — 85025 COMPLETE CBC W/AUTO DIFF WBC: CPT

## 2017-08-14 PROCEDURE — 87086 URINE CULTURE/COLONY COUNT: CPT

## 2017-08-14 PROCEDURE — 6360000002 HC RX W HCPCS: Performed by: NURSE PRACTITIONER

## 2017-08-14 PROCEDURE — 94640 AIRWAY INHALATION TREATMENT: CPT

## 2017-08-14 PROCEDURE — C9359 IMPLNT,BON VOID FILLER-PUTTY: HCPCS | Performed by: NEUROLOGICAL SURGERY

## 2017-08-14 PROCEDURE — 6360000002 HC RX W HCPCS: Performed by: ANESTHESIOLOGY

## 2017-08-14 PROCEDURE — 2580000003 HC RX 258: Performed by: NURSE ANESTHETIST, CERTIFIED REGISTERED

## 2017-08-14 PROCEDURE — 3700000000 HC ANESTHESIA ATTENDED CARE: Performed by: NEUROLOGICAL SURGERY

## 2017-08-14 PROCEDURE — 94762 N-INVAS EAR/PLS OXIMTRY CONT: CPT

## 2017-08-14 PROCEDURE — 99231 SBSQ HOSP IP/OBS SF/LOW 25: CPT | Performed by: NEUROLOGICAL SURGERY

## 2017-08-14 PROCEDURE — 3600000004 HC SURGERY LEVEL 4 BASE: Performed by: NEUROLOGICAL SURGERY

## 2017-08-14 PROCEDURE — 2500000003 HC RX 250 WO HCPCS: Performed by: NURSE ANESTHETIST, CERTIFIED REGISTERED

## 2017-08-14 PROCEDURE — A6257 TRANSPARENT FILM <= 16 SQ IN: HCPCS | Performed by: NEUROLOGICAL SURGERY

## 2017-08-14 PROCEDURE — 3600000014 HC SURGERY LEVEL 4 ADDTL 15MIN: Performed by: NEUROLOGICAL SURGERY

## 2017-08-14 PROCEDURE — 0RG2071 FUSION OF 2 OR MORE CERVICAL VERTEBRAL JOINTS WITH AUTOLOGOUS TISSUE SUBSTITUTE, POSTERIOR APPROACH, POSTERIOR COLUMN, OPEN APPROACH: ICD-10-PCS | Performed by: NEUROLOGICAL SURGERY

## 2017-08-14 PROCEDURE — 2580000003 HC RX 258: Performed by: NEUROLOGICAL SURGERY

## 2017-08-14 PROCEDURE — C9290 INJ, BUPIVACAINE LIPOSOME: HCPCS | Performed by: NEUROLOGICAL SURGERY

## 2017-08-14 PROCEDURE — C1713 ANCHOR/SCREW BN/BN,TIS/BN: HCPCS | Performed by: NEUROLOGICAL SURGERY

## 2017-08-14 PROCEDURE — 2720000010 HC SURG SUPPLY STERILE: Performed by: NEUROLOGICAL SURGERY

## 2017-08-14 DEVICE — IMPLANTABLE DEVICE
Type: IMPLANTABLE DEVICE | Status: FUNCTIONAL
Brand: PUROS®

## 2017-08-14 DEVICE — SCREW SPNL L12MM DIA3.5MM MINI OCCIPITOCERVICOTHORACIC TI: Type: IMPLANTABLE DEVICE | Status: FUNCTIONAL

## 2017-08-14 DEVICE — ROD SPNL L120MM DIA5.5MM TI STR SMOOTH FOR OCT SYS: Type: IMPLANTABLE DEVICE | Status: FUNCTIONAL

## 2017-08-14 DEVICE — SCREW SPNL OCCIPITOCERVICOTHORACIC TI INNR FOR 3.5MM ROD: Type: IMPLANTABLE DEVICE | Status: FUNCTIONAL

## 2017-08-14 RX ORDER — GLYCOPYRROLATE 0.2 MG/ML
INJECTION INTRAMUSCULAR; INTRAVENOUS PRN
Status: DISCONTINUED | OUTPATIENT
Start: 2017-08-14 | End: 2017-08-14 | Stop reason: SDUPTHER

## 2017-08-14 RX ORDER — LIDOCAINE HYDROCHLORIDE AND EPINEPHRINE BITARTRATE 20; .01 MG/ML; MG/ML
INJECTION, SOLUTION SUBCUTANEOUS PRN
Status: DISCONTINUED | OUTPATIENT
Start: 2017-08-14 | End: 2017-08-14 | Stop reason: HOSPADM

## 2017-08-14 RX ORDER — DIPHENHYDRAMINE HYDROCHLORIDE 50 MG/ML
12.5 INJECTION INTRAMUSCULAR; INTRAVENOUS
Status: DISCONTINUED | OUTPATIENT
Start: 2017-08-14 | End: 2017-08-14 | Stop reason: HOSPADM

## 2017-08-14 RX ORDER — LIDOCAINE HYDROCHLORIDE 10 MG/ML
INJECTION, SOLUTION EPIDURAL; INFILTRATION; INTRACAUDAL; PERINEURAL PRN
Status: DISCONTINUED | OUTPATIENT
Start: 2017-08-14 | End: 2017-08-14 | Stop reason: SDUPTHER

## 2017-08-14 RX ORDER — PROPOFOL 10 MG/ML
INJECTION, EMULSION INTRAVENOUS CONTINUOUS PRN
Status: DISCONTINUED | OUTPATIENT
Start: 2017-08-14 | End: 2017-08-14 | Stop reason: SDUPTHER

## 2017-08-14 RX ORDER — DIAZEPAM 5 MG/1
5 TABLET ORAL EVERY 6 HOURS PRN
Status: DISCONTINUED | OUTPATIENT
Start: 2017-08-14 | End: 2017-08-17 | Stop reason: HOSPADM

## 2017-08-14 RX ORDER — FENTANYL CITRATE 50 UG/ML
INJECTION, SOLUTION INTRAMUSCULAR; INTRAVENOUS PRN
Status: DISCONTINUED | OUTPATIENT
Start: 2017-08-14 | End: 2017-08-14 | Stop reason: SDUPTHER

## 2017-08-14 RX ORDER — ROCURONIUM BROMIDE 10 MG/ML
INJECTION, SOLUTION INTRAVENOUS PRN
Status: DISCONTINUED | OUTPATIENT
Start: 2017-08-14 | End: 2017-08-14 | Stop reason: SDUPTHER

## 2017-08-14 RX ORDER — FENTANYL CITRATE 50 UG/ML
50 INJECTION, SOLUTION INTRAMUSCULAR; INTRAVENOUS EVERY 5 MIN PRN
Status: DISCONTINUED | OUTPATIENT
Start: 2017-08-14 | End: 2017-08-14 | Stop reason: HOSPADM

## 2017-08-14 RX ORDER — DEXAMETHASONE SODIUM PHOSPHATE 10 MG/ML
INJECTION INTRAMUSCULAR; INTRAVENOUS PRN
Status: DISCONTINUED | OUTPATIENT
Start: 2017-08-14 | End: 2017-08-14 | Stop reason: SDUPTHER

## 2017-08-14 RX ORDER — ONDANSETRON 2 MG/ML
4 INJECTION INTRAMUSCULAR; INTRAVENOUS
Status: DISCONTINUED | OUTPATIENT
Start: 2017-08-14 | End: 2017-08-14 | Stop reason: HOSPADM

## 2017-08-14 RX ORDER — GINSENG 100 MG
CAPSULE ORAL PRN
Status: DISCONTINUED | OUTPATIENT
Start: 2017-08-14 | End: 2017-08-14 | Stop reason: HOSPADM

## 2017-08-14 RX ORDER — OXYCODONE HCL 10 MG/1
10 TABLET, FILM COATED, EXTENDED RELEASE ORAL EVERY 12 HOURS SCHEDULED
Status: COMPLETED | OUTPATIENT
Start: 2017-08-15 | End: 2017-08-16

## 2017-08-14 RX ORDER — PROPOFOL 10 MG/ML
INJECTION, EMULSION INTRAVENOUS PRN
Status: DISCONTINUED | OUTPATIENT
Start: 2017-08-14 | End: 2017-08-14 | Stop reason: SDUPTHER

## 2017-08-14 RX ORDER — SODIUM CHLORIDE, SODIUM LACTATE, POTASSIUM CHLORIDE, CALCIUM CHLORIDE 600; 310; 30; 20 MG/100ML; MG/100ML; MG/100ML; MG/100ML
INJECTION, SOLUTION INTRAVENOUS CONTINUOUS PRN
Status: DISCONTINUED | OUTPATIENT
Start: 2017-08-14 | End: 2017-08-14 | Stop reason: SDUPTHER

## 2017-08-14 RX ORDER — FENTANYL CITRATE 50 UG/ML
25 INJECTION, SOLUTION INTRAMUSCULAR; INTRAVENOUS EVERY 5 MIN PRN
Status: DISCONTINUED | OUTPATIENT
Start: 2017-08-14 | End: 2017-08-14 | Stop reason: HOSPADM

## 2017-08-14 RX ORDER — MAGNESIUM HYDROXIDE 1200 MG/15ML
LIQUID ORAL CONTINUOUS PRN
Status: DISCONTINUED | OUTPATIENT
Start: 2017-08-14 | End: 2017-08-14 | Stop reason: HOSPADM

## 2017-08-14 RX ORDER — NALOXONE HYDROCHLORIDE 0.4 MG/ML
0.4 INJECTION, SOLUTION INTRAMUSCULAR; INTRAVENOUS; SUBCUTANEOUS PRN
Status: DISCONTINUED | OUTPATIENT
Start: 2017-08-14 | End: 2017-08-15

## 2017-08-14 RX ORDER — FENTANYL CITRATE 50 UG/ML
25 INJECTION, SOLUTION INTRAMUSCULAR; INTRAVENOUS EVERY 5 MIN PRN
Status: DISCONTINUED | OUTPATIENT
Start: 2017-08-14 | End: 2017-08-17 | Stop reason: HOSPADM

## 2017-08-14 RX ORDER — OXYCODONE HYDROCHLORIDE AND ACETAMINOPHEN 5; 325 MG/1; MG/1
1 TABLET ORAL EVERY 4 HOURS PRN
Status: DISCONTINUED | OUTPATIENT
Start: 2017-08-14 | End: 2017-08-17 | Stop reason: HOSPADM

## 2017-08-14 RX ORDER — ONDANSETRON 2 MG/ML
INJECTION INTRAMUSCULAR; INTRAVENOUS PRN
Status: DISCONTINUED | OUTPATIENT
Start: 2017-08-14 | End: 2017-08-14 | Stop reason: SDUPTHER

## 2017-08-14 RX ORDER — MIDAZOLAM HYDROCHLORIDE 1 MG/ML
1 INJECTION INTRAMUSCULAR; INTRAVENOUS EVERY 5 MIN PRN
Status: DISCONTINUED | OUTPATIENT
Start: 2017-08-14 | End: 2017-08-17 | Stop reason: HOSPADM

## 2017-08-14 RX ORDER — MIDAZOLAM HYDROCHLORIDE 1 MG/ML
INJECTION INTRAMUSCULAR; INTRAVENOUS PRN
Status: DISCONTINUED | OUTPATIENT
Start: 2017-08-14 | End: 2017-08-14 | Stop reason: SDUPTHER

## 2017-08-14 RX ORDER — OXYCODONE HCL 20 MG/1
20 TABLET, FILM COATED, EXTENDED RELEASE ORAL EVERY 12 HOURS SCHEDULED
Status: DISPENSED | OUTPATIENT
Start: 2017-08-14 | End: 2017-08-15

## 2017-08-14 RX ADMIN — SODIUM CHLORIDE, POTASSIUM CHLORIDE, SODIUM LACTATE AND CALCIUM CHLORIDE: 600; 310; 30; 20 INJECTION, SOLUTION INTRAVENOUS at 13:43

## 2017-08-14 RX ADMIN — PROPOFOL 200 MG: 10 INJECTION, EMULSION INTRAVENOUS at 13:35

## 2017-08-14 RX ADMIN — GLYCOPYRROLATE 0.2 MG: 0.2 INJECTION, SOLUTION INTRAMUSCULAR; INTRAVENOUS at 13:54

## 2017-08-14 RX ADMIN — REMIFENTANIL HYDROCHLORIDE 0.03 MCG/KG/MIN: 1 INJECTION, POWDER, LYOPHILIZED, FOR SOLUTION INTRAVENOUS at 13:49

## 2017-08-14 RX ADMIN — SODIUM CHLORIDE, POTASSIUM CHLORIDE, SODIUM LACTATE AND CALCIUM CHLORIDE: 600; 310; 30; 20 INJECTION, SOLUTION INTRAVENOUS at 14:00

## 2017-08-14 RX ADMIN — FENTANYL CITRATE 100 MCG: 50 INJECTION INTRAMUSCULAR; INTRAVENOUS at 13:35

## 2017-08-14 RX ADMIN — HYDROMORPHONE HYDROCHLORIDE 0.5 MG: 1 INJECTION, SOLUTION INTRAMUSCULAR; INTRAVENOUS; SUBCUTANEOUS at 18:10

## 2017-08-14 RX ADMIN — IPRATROPIUM BROMIDE 2 PUFF: 17 AEROSOL, METERED RESPIRATORY (INHALATION) at 07:54

## 2017-08-14 RX ADMIN — GLYCOPYRROLATE 0.2 MG: 0.2 INJECTION, SOLUTION INTRAMUSCULAR; INTRAVENOUS at 15:50

## 2017-08-14 RX ADMIN — HYDROMORPHONE HYDROCHLORIDE 0.5 MG: 1 INJECTION, SOLUTION INTRAMUSCULAR; INTRAVENOUS; SUBCUTANEOUS at 17:35

## 2017-08-14 RX ADMIN — MIDAZOLAM HYDROCHLORIDE 2 MG: 1 INJECTION, SOLUTION INTRAMUSCULAR; INTRAVENOUS at 13:31

## 2017-08-14 RX ADMIN — ALBUTEROL SULFATE 2 PUFF: 90 AEROSOL, METERED RESPIRATORY (INHALATION) at 07:54

## 2017-08-14 RX ADMIN — PHENYLEPHRINE HYDROCHLORIDE 200 MCG: 10 INJECTION INTRAMUSCULAR; INTRAVENOUS; SUBCUTANEOUS at 13:50

## 2017-08-14 RX ADMIN — HYDROMORPHONE HYDROCHLORIDE 0.5 MG: 1 INJECTION, SOLUTION INTRAMUSCULAR; INTRAVENOUS; SUBCUTANEOUS at 17:30

## 2017-08-14 RX ADMIN — PHENYLEPHRINE HYDROCHLORIDE 50 MCG/MIN: 10 INJECTION INTRAMUSCULAR; INTRAVENOUS; SUBCUTANEOUS at 13:49

## 2017-08-14 RX ADMIN — FENTANYL CITRATE 25 MCG: 50 INJECTION INTRAMUSCULAR; INTRAVENOUS at 17:15

## 2017-08-14 RX ADMIN — FENTANYL CITRATE 50 MCG: 50 INJECTION INTRAMUSCULAR; INTRAVENOUS at 17:25

## 2017-08-14 RX ADMIN — GLYCOPYRROLATE 0.2 MG: 0.2 INJECTION, SOLUTION INTRAMUSCULAR; INTRAVENOUS at 13:49

## 2017-08-14 RX ADMIN — HYDROMORPHONE HYDROCHLORIDE 0.5 MG: 1 INJECTION, SOLUTION INTRAMUSCULAR; INTRAVENOUS; SUBCUTANEOUS at 18:15

## 2017-08-14 RX ADMIN — PHENYLEPHRINE HYDROCHLORIDE 200 MCG: 10 INJECTION INTRAMUSCULAR; INTRAVENOUS; SUBCUTANEOUS at 13:57

## 2017-08-14 RX ADMIN — DEXAMETHASONE SODIUM PHOSPHATE 10 MG: 10 INJECTION INTRAMUSCULAR; INTRAVENOUS at 13:45

## 2017-08-14 RX ADMIN — FENTANYL CITRATE 50 MCG: 50 INJECTION INTRAMUSCULAR; INTRAVENOUS at 17:20

## 2017-08-14 RX ADMIN — IPRATROPIUM BROMIDE 2 PUFF: 17 AEROSOL, METERED RESPIRATORY (INHALATION) at 12:12

## 2017-08-14 RX ADMIN — PROPOFOL 75 MCG/KG/MIN: 10 INJECTION, EMULSION INTRAVENOUS at 13:49

## 2017-08-14 RX ADMIN — ROCURONIUM BROMIDE 50 MG: 10 INJECTION INTRAVENOUS at 13:35

## 2017-08-14 RX ADMIN — MORPHINE SULFATE 4 MG: 4 INJECTION, SOLUTION INTRAMUSCULAR; INTRAVENOUS at 05:47

## 2017-08-14 RX ADMIN — DEXAMETHASONE SODIUM PHOSPHATE 4 MG: 4 INJECTION, SOLUTION INTRAMUSCULAR; INTRAVENOUS at 08:39

## 2017-08-14 RX ADMIN — ALBUTEROL SULFATE 2 PUFF: 90 AEROSOL, METERED RESPIRATORY (INHALATION) at 12:12

## 2017-08-14 RX ADMIN — DEXAMETHASONE SODIUM PHOSPHATE 4 MG: 4 INJECTION, SOLUTION INTRAMUSCULAR; INTRAVENOUS at 22:08

## 2017-08-14 RX ADMIN — Medication 2 G: at 14:26

## 2017-08-14 RX ADMIN — LIDOCAINE HYDROCHLORIDE 50 MG: 10 INJECTION, SOLUTION EPIDURAL; INFILTRATION; INTRACAUDAL; PERINEURAL at 13:35

## 2017-08-14 RX ADMIN — SODIUM CHLORIDE, POTASSIUM CHLORIDE, SODIUM LACTATE AND CALCIUM CHLORIDE: 600; 310; 30; 20 INJECTION, SOLUTION INTRAVENOUS at 13:29

## 2017-08-14 RX ADMIN — DEXAMETHASONE SODIUM PHOSPHATE 4 MG: 4 INJECTION, SOLUTION INTRAMUSCULAR; INTRAVENOUS at 02:52

## 2017-08-14 RX ADMIN — FENTANYL CITRATE 25 MCG: 50 INJECTION INTRAMUSCULAR; INTRAVENOUS at 16:30

## 2017-08-14 RX ADMIN — Medication: at 18:45

## 2017-08-14 RX ADMIN — GLYCOPYRROLATE 0.6 MG: 0.2 INJECTION, SOLUTION INTRAMUSCULAR; INTRAVENOUS at 16:57

## 2017-08-14 RX ADMIN — ROCURONIUM BROMIDE 40 MG: 10 INJECTION INTRAVENOUS at 14:27

## 2017-08-14 RX ADMIN — GLYCOPYRROLATE 0.2 MG: 0.2 INJECTION, SOLUTION INTRAMUSCULAR; INTRAVENOUS at 15:04

## 2017-08-14 RX ADMIN — FENTANYL CITRATE 100 MCG: 50 INJECTION INTRAMUSCULAR; INTRAVENOUS at 14:11

## 2017-08-14 RX ADMIN — MORPHINE SULFATE 4 MG: 4 INJECTION, SOLUTION INTRAMUSCULAR; INTRAVENOUS at 08:42

## 2017-08-14 RX ADMIN — ONDANSETRON 4 MG: 2 INJECTION, SOLUTION INTRAMUSCULAR; INTRAVENOUS at 16:32

## 2017-08-14 RX ADMIN — NEOSTIGMINE METHYLSULFATE 3 MG: 1 INJECTION, SOLUTION INTRAMUSCULAR; INTRAVENOUS; SUBCUTANEOUS at 16:57

## 2017-08-14 RX ADMIN — Medication 10 ML: at 08:50

## 2017-08-14 RX ADMIN — CEFAZOLIN SODIUM 1 G: 1 INJECTION, SOLUTION INTRAVENOUS at 22:08

## 2017-08-14 RX ADMIN — Medication 10 ML: at 22:14

## 2017-08-14 ASSESSMENT — PAIN SCALES - GENERAL
PAINLEVEL_OUTOF10: 9
PAINLEVEL_OUTOF10: 7
PAINLEVEL_OUTOF10: 6
PAINLEVEL_OUTOF10: 4
PAINLEVEL_OUTOF10: 8
PAINLEVEL_OUTOF10: 6
PAINLEVEL_OUTOF10: 7
PAINLEVEL_OUTOF10: 6
PAINLEVEL_OUTOF10: 7
PAINLEVEL_OUTOF10: 8
PAINLEVEL_OUTOF10: 10
PAINLEVEL_OUTOF10: 10
PAINLEVEL_OUTOF10: 6
PAINLEVEL_OUTOF10: 5
PAINLEVEL_OUTOF10: 7

## 2017-08-14 ASSESSMENT — ENCOUNTER SYMPTOMS
DIARRHEA: 0
ABDOMINAL PAIN: 1
SHORTNESS OF BREATH: 0
WHEEZING: 0
NAUSEA: 0
CONSTIPATION: 0
VOMITING: 0

## 2017-08-14 ASSESSMENT — PAIN DESCRIPTION - ONSET: ONSET: ON-GOING

## 2017-08-14 ASSESSMENT — PAIN DESCRIPTION - LOCATION
LOCATION: BACK
LOCATION: BACK;NECK
LOCATION: ARM;BACK;NECK;SHOULDER

## 2017-08-14 ASSESSMENT — PAIN DESCRIPTION - ORIENTATION: ORIENTATION: LEFT

## 2017-08-14 ASSESSMENT — PAIN DESCRIPTION - PAIN TYPE
TYPE: SURGICAL PAIN
TYPE: ACUTE PAIN

## 2017-08-14 ASSESSMENT — PAIN DESCRIPTION - FREQUENCY: FREQUENCY: CONTINUOUS

## 2017-08-14 ASSESSMENT — PAIN DESCRIPTION - DESCRIPTORS
DESCRIPTORS: DISCOMFORT
DESCRIPTORS: ACHING;DISCOMFORT

## 2017-08-15 ENCOUNTER — APPOINTMENT (OUTPATIENT)
Dept: GENERAL RADIOLOGY | Age: 67
DRG: 958 | End: 2017-08-15
Payer: MEDICARE

## 2017-08-15 LAB
ABSOLUTE EOS #: 0 K/UL (ref 0–0.4)
ABSOLUTE LYMPH #: 0.6 K/UL (ref 1–4.8)
ABSOLUTE MONO #: 1.2 K/UL (ref 0.1–1.2)
ANION GAP SERPL CALCULATED.3IONS-SCNC: 13 MMOL/L (ref 9–17)
BASOPHILS # BLD: 0 %
BASOPHILS ABSOLUTE: 0 K/UL (ref 0–0.2)
BUN BLDV-MCNC: 9 MG/DL (ref 8–23)
BUN/CREAT BLD: ABNORMAL (ref 9–20)
CALCIUM SERPL-MCNC: 8.1 MG/DL (ref 8.6–10.4)
CHLORIDE BLD-SCNC: 103 MMOL/L (ref 98–107)
CO2: 23 MMOL/L (ref 20–31)
CREAT SERPL-MCNC: 0.55 MG/DL (ref 0.7–1.2)
CULTURE: NO GROWTH
CULTURE: NORMAL
DIFFERENTIAL TYPE: ABNORMAL
EOSINOPHILS RELATIVE PERCENT: 0 %
GFR AFRICAN AMERICAN: >60 ML/MIN
GFR NON-AFRICAN AMERICAN: >60 ML/MIN
GFR SERPL CREATININE-BSD FRML MDRD: ABNORMAL ML/MIN/{1.73_M2}
GFR SERPL CREATININE-BSD FRML MDRD: ABNORMAL ML/MIN/{1.73_M2}
GLUCOSE BLD-MCNC: 118 MG/DL (ref 70–99)
HCT VFR BLD CALC: 38.9 % (ref 41–53)
HEMOGLOBIN: 13.4 G/DL (ref 13.5–17.5)
LYMPHOCYTES # BLD: 5 %
Lab: NORMAL
MCH RBC QN AUTO: 31.8 PG (ref 26–34)
MCHC RBC AUTO-ENTMCNC: 34.5 G/DL (ref 31–37)
MCV RBC AUTO: 92.2 FL (ref 80–100)
MONOCYTES # BLD: 12 %
PDW BLD-RTO: 15.4 % (ref 12.5–15.4)
PLATELET # BLD: 185 K/UL (ref 140–450)
PLATELET ESTIMATE: ABNORMAL
PMV BLD AUTO: 8.4 FL (ref 6–12)
POTASSIUM SERPL-SCNC: 3.9 MMOL/L (ref 3.7–5.3)
RBC # BLD: 4.22 M/UL (ref 4.5–5.9)
RBC # BLD: ABNORMAL 10*6/UL
SEG NEUTROPHILS: 83 %
SEGMENTED NEUTROPHILS ABSOLUTE COUNT: 8.7 K/UL (ref 1.8–7.7)
SODIUM BLD-SCNC: 139 MMOL/L (ref 135–144)
SPECIMEN DESCRIPTION: NORMAL
STATUS: NORMAL
WBC # BLD: 10.5 K/UL (ref 3.5–11)
WBC # BLD: ABNORMAL 10*3/UL

## 2017-08-15 PROCEDURE — 6360000002 HC RX W HCPCS: Performed by: PHYSICIAN ASSISTANT

## 2017-08-15 PROCEDURE — 97162 PT EVAL MOD COMPLEX 30 MIN: CPT

## 2017-08-15 PROCEDURE — 97166 OT EVAL MOD COMPLEX 45 MIN: CPT

## 2017-08-15 PROCEDURE — 94640 AIRWAY INHALATION TREATMENT: CPT

## 2017-08-15 PROCEDURE — 72040 X-RAY EXAM NECK SPINE 2-3 VW: CPT

## 2017-08-15 PROCEDURE — 2580000003 HC RX 258: Performed by: EMERGENCY MEDICINE

## 2017-08-15 PROCEDURE — 6370000000 HC RX 637 (ALT 250 FOR IP): Performed by: NURSE PRACTITIONER

## 2017-08-15 PROCEDURE — 97535 SELF CARE MNGMENT TRAINING: CPT

## 2017-08-15 PROCEDURE — 99233 SBSQ HOSP IP/OBS HIGH 50: CPT | Performed by: INTERNAL MEDICINE

## 2017-08-15 PROCEDURE — 6370000000 HC RX 637 (ALT 250 FOR IP): Performed by: FAMILY MEDICINE

## 2017-08-15 PROCEDURE — 99232 SBSQ HOSP IP/OBS MODERATE 35: CPT | Performed by: FAMILY MEDICINE

## 2017-08-15 PROCEDURE — 2580000003 HC RX 258: Performed by: NURSE PRACTITIONER

## 2017-08-15 PROCEDURE — 97530 THERAPEUTIC ACTIVITIES: CPT

## 2017-08-15 PROCEDURE — 94762 N-INVAS EAR/PLS OXIMTRY CONT: CPT

## 2017-08-15 PROCEDURE — 6360000002 HC RX W HCPCS: Performed by: NEUROLOGICAL SURGERY

## 2017-08-15 PROCEDURE — 85025 COMPLETE CBC W/AUTO DIFF WBC: CPT

## 2017-08-15 PROCEDURE — 6370000000 HC RX 637 (ALT 250 FOR IP): Performed by: NEUROLOGICAL SURGERY

## 2017-08-15 PROCEDURE — G8979 MOBILITY GOAL STATUS: HCPCS

## 2017-08-15 PROCEDURE — 80048 BASIC METABOLIC PNL TOTAL CA: CPT

## 2017-08-15 PROCEDURE — G8987 SELF CARE CURRENT STATUS: HCPCS

## 2017-08-15 PROCEDURE — G8978 MOBILITY CURRENT STATUS: HCPCS

## 2017-08-15 PROCEDURE — 2060000000 HC ICU INTERMEDIATE R&B

## 2017-08-15 PROCEDURE — 99406 BEHAV CHNG SMOKING 3-10 MIN: CPT

## 2017-08-15 PROCEDURE — G8988 SELF CARE GOAL STATUS: HCPCS

## 2017-08-15 PROCEDURE — 36415 COLL VENOUS BLD VENIPUNCTURE: CPT

## 2017-08-15 RX ADMIN — FAMOTIDINE 20 MG: 20 TABLET, FILM COATED ORAL at 20:25

## 2017-08-15 RX ADMIN — Medication 10 ML: at 20:26

## 2017-08-15 RX ADMIN — DEXAMETHASONE SODIUM PHOSPHATE 4 MG: 4 INJECTION, SOLUTION INTRAMUSCULAR; INTRAVENOUS at 15:34

## 2017-08-15 RX ADMIN — DOCUSATE SODIUM 100 MG: 100 CAPSULE ORAL at 08:54

## 2017-08-15 RX ADMIN — FAMOTIDINE 20 MG: 20 TABLET, FILM COATED ORAL at 08:54

## 2017-08-15 RX ADMIN — DEXAMETHASONE SODIUM PHOSPHATE 4 MG: 4 INJECTION, SOLUTION INTRAMUSCULAR; INTRAVENOUS at 06:08

## 2017-08-15 RX ADMIN — ALBUTEROL SULFATE 2 PUFF: 90 AEROSOL, METERED RESPIRATORY (INHALATION) at 11:51

## 2017-08-15 RX ADMIN — AMLODIPINE BESYLATE 10 MG: 10 TABLET ORAL at 08:54

## 2017-08-15 RX ADMIN — IPRATROPIUM BROMIDE 2 PUFF: 17 AEROSOL, METERED RESPIRATORY (INHALATION) at 20:22

## 2017-08-15 RX ADMIN — IPRATROPIUM BROMIDE 2 PUFF: 17 AEROSOL, METERED RESPIRATORY (INHALATION) at 11:52

## 2017-08-15 RX ADMIN — DEXAMETHASONE SODIUM PHOSPHATE 4 MG: 4 INJECTION, SOLUTION INTRAMUSCULAR; INTRAVENOUS at 20:25

## 2017-08-15 RX ADMIN — CEFAZOLIN SODIUM 1 G: 1 INJECTION, SOLUTION INTRAVENOUS at 06:08

## 2017-08-15 RX ADMIN — OXYCODONE HYDROCHLORIDE AND ACETAMINOPHEN 1 TABLET: 5; 325 TABLET ORAL at 15:44

## 2017-08-15 RX ADMIN — IPRATROPIUM BROMIDE 2 PUFF: 17 AEROSOL, METERED RESPIRATORY (INHALATION) at 08:44

## 2017-08-15 RX ADMIN — ALBUTEROL SULFATE 2 PUFF: 90 AEROSOL, METERED RESPIRATORY (INHALATION) at 08:45

## 2017-08-15 RX ADMIN — LISINOPRIL 40 MG: 20 TABLET ORAL at 08:54

## 2017-08-15 RX ADMIN — Medication 10 ML: at 08:55

## 2017-08-15 RX ADMIN — IPRATROPIUM BROMIDE 2 PUFF: 17 AEROSOL, METERED RESPIRATORY (INHALATION) at 16:05

## 2017-08-15 RX ADMIN — ALBUTEROL SULFATE 2 PUFF: 90 AEROSOL, METERED RESPIRATORY (INHALATION) at 20:22

## 2017-08-15 RX ADMIN — ALBUTEROL SULFATE 2 PUFF: 90 AEROSOL, METERED RESPIRATORY (INHALATION) at 16:05

## 2017-08-15 RX ADMIN — OXYCODONE HYDROCHLORIDE 20 MG: 20 TABLET, FILM COATED, EXTENDED RELEASE ORAL at 08:55

## 2017-08-15 RX ADMIN — DEXAMETHASONE SODIUM PHOSPHATE 4 MG: 4 INJECTION, SOLUTION INTRAMUSCULAR; INTRAVENOUS at 08:54

## 2017-08-15 RX ADMIN — OXYCODONE HYDROCHLORIDE 10 MG: 10 TABLET, FILM COATED, EXTENDED RELEASE ORAL at 20:25

## 2017-08-15 ASSESSMENT — PAIN DESCRIPTION - LOCATION
LOCATION: NECK;SHOULDER
LOCATION: BACK;NECK
LOCATION: NECK;SHOULDER
LOCATION: BACK;SHOULDER

## 2017-08-15 ASSESSMENT — PAIN SCALES - GENERAL
PAINLEVEL_OUTOF10: 3
PAINLEVEL_OUTOF10: 10
PAINLEVEL_OUTOF10: 10
PAINLEVEL_OUTOF10: 3
PAINLEVEL_OUTOF10: 8
PAINLEVEL_OUTOF10: 8

## 2017-08-15 ASSESSMENT — ENCOUNTER SYMPTOMS
NAUSEA: 0
SHORTNESS OF BREATH: 0
ABDOMINAL PAIN: 0
DIARRHEA: 0
CONSTIPATION: 0
WHEEZING: 0
VOMITING: 0

## 2017-08-15 ASSESSMENT — PAIN DESCRIPTION - DESCRIPTORS
DESCRIPTORS: ACHING
DESCRIPTORS: ACHING

## 2017-08-15 ASSESSMENT — PAIN DESCRIPTION - PAIN TYPE
TYPE: SURGICAL PAIN

## 2017-08-15 ASSESSMENT — PAIN DESCRIPTION - PROGRESSION
CLINICAL_PROGRESSION: NOT CHANGED
CLINICAL_PROGRESSION: NOT CHANGED

## 2017-08-15 ASSESSMENT — PAIN DESCRIPTION - ORIENTATION
ORIENTATION: POSTERIOR

## 2017-08-15 ASSESSMENT — PAIN DESCRIPTION - ONSET
ONSET: ON-GOING
ONSET: ON-GOING

## 2017-08-15 ASSESSMENT — PAIN DESCRIPTION - FREQUENCY
FREQUENCY: CONTINUOUS
FREQUENCY: INTERMITTENT

## 2017-08-16 LAB
ABSOLUTE EOS #: 0 K/UL (ref 0–0.4)
ABSOLUTE LYMPH #: 0.6 K/UL (ref 1–4.8)
ABSOLUTE MONO #: 0.7 K/UL (ref 0.1–1.2)
ANION GAP SERPL CALCULATED.3IONS-SCNC: 11 MMOL/L (ref 9–17)
BASOPHILS # BLD: 1 %
BASOPHILS ABSOLUTE: 0 K/UL (ref 0–0.2)
BUN BLDV-MCNC: 13 MG/DL (ref 8–23)
BUN/CREAT BLD: ABNORMAL (ref 9–20)
CALCIUM SERPL-MCNC: 8.4 MG/DL (ref 8.6–10.4)
CHLORIDE BLD-SCNC: 102 MMOL/L (ref 98–107)
CO2: 24 MMOL/L (ref 20–31)
CREAT SERPL-MCNC: 0.63 MG/DL (ref 0.7–1.2)
DIFFERENTIAL TYPE: ABNORMAL
EOSINOPHILS RELATIVE PERCENT: 0 %
GFR AFRICAN AMERICAN: >60 ML/MIN
GFR NON-AFRICAN AMERICAN: >60 ML/MIN
GFR SERPL CREATININE-BSD FRML MDRD: ABNORMAL ML/MIN/{1.73_M2}
GFR SERPL CREATININE-BSD FRML MDRD: ABNORMAL ML/MIN/{1.73_M2}
GLUCOSE BLD-MCNC: 138 MG/DL (ref 70–99)
HCT VFR BLD CALC: 36.9 % (ref 41–53)
HEMOGLOBIN: 12.6 G/DL (ref 13.5–17.5)
LYMPHOCYTES # BLD: 7 %
MCH RBC QN AUTO: 31.5 PG (ref 26–34)
MCHC RBC AUTO-ENTMCNC: 34.3 G/DL (ref 31–37)
MCV RBC AUTO: 92.1 FL (ref 80–100)
MONOCYTES # BLD: 9 %
PDW BLD-RTO: 15.1 % (ref 12.5–15.4)
PLATELET # BLD: 177 K/UL (ref 140–450)
PLATELET ESTIMATE: ABNORMAL
PMV BLD AUTO: 8.4 FL (ref 6–12)
POTASSIUM SERPL-SCNC: 3.8 MMOL/L (ref 3.7–5.3)
RBC # BLD: 4.01 M/UL (ref 4.5–5.9)
RBC # BLD: ABNORMAL 10*6/UL
SEG NEUTROPHILS: 83 %
SEGMENTED NEUTROPHILS ABSOLUTE COUNT: 6.5 K/UL (ref 1.8–7.7)
SODIUM BLD-SCNC: 137 MMOL/L (ref 135–144)
WBC # BLD: 7.9 K/UL (ref 3.5–11)
WBC # BLD: ABNORMAL 10*3/UL

## 2017-08-16 PROCEDURE — 94640 AIRWAY INHALATION TREATMENT: CPT

## 2017-08-16 PROCEDURE — 2580000003 HC RX 258: Performed by: NURSE PRACTITIONER

## 2017-08-16 PROCEDURE — 97110 THERAPEUTIC EXERCISES: CPT

## 2017-08-16 PROCEDURE — 2060000000 HC ICU INTERMEDIATE R&B

## 2017-08-16 PROCEDURE — 6360000002 HC RX W HCPCS: Performed by: PHYSICIAN ASSISTANT

## 2017-08-16 PROCEDURE — 6370000000 HC RX 637 (ALT 250 FOR IP): Performed by: NEUROLOGICAL SURGERY

## 2017-08-16 PROCEDURE — 36415 COLL VENOUS BLD VENIPUNCTURE: CPT

## 2017-08-16 PROCEDURE — 99024 POSTOP FOLLOW-UP VISIT: CPT | Performed by: NEUROLOGICAL SURGERY

## 2017-08-16 PROCEDURE — 6370000000 HC RX 637 (ALT 250 FOR IP): Performed by: NURSE PRACTITIONER

## 2017-08-16 PROCEDURE — 80048 BASIC METABOLIC PNL TOTAL CA: CPT

## 2017-08-16 PROCEDURE — 99232 SBSQ HOSP IP/OBS MODERATE 35: CPT | Performed by: INTERNAL MEDICINE

## 2017-08-16 PROCEDURE — 97116 GAIT TRAINING THERAPY: CPT

## 2017-08-16 PROCEDURE — 6370000000 HC RX 637 (ALT 250 FOR IP): Performed by: FAMILY MEDICINE

## 2017-08-16 PROCEDURE — 85025 COMPLETE CBC W/AUTO DIFF WBC: CPT

## 2017-08-16 PROCEDURE — 99232 SBSQ HOSP IP/OBS MODERATE 35: CPT | Performed by: FAMILY MEDICINE

## 2017-08-16 PROCEDURE — 94762 N-INVAS EAR/PLS OXIMTRY CONT: CPT

## 2017-08-16 RX ADMIN — FAMOTIDINE 20 MG: 20 TABLET, FILM COATED ORAL at 09:07

## 2017-08-16 RX ADMIN — OXYCODONE HYDROCHLORIDE 10 MG: 10 TABLET, FILM COATED, EXTENDED RELEASE ORAL at 09:07

## 2017-08-16 RX ADMIN — OXYCODONE HYDROCHLORIDE AND ACETAMINOPHEN 1 TABLET: 5; 325 TABLET ORAL at 01:54

## 2017-08-16 RX ADMIN — AMLODIPINE BESYLATE 10 MG: 10 TABLET ORAL at 09:07

## 2017-08-16 RX ADMIN — ALBUTEROL SULFATE 2 PUFF: 90 AEROSOL, METERED RESPIRATORY (INHALATION) at 08:05

## 2017-08-16 RX ADMIN — OXYCODONE HYDROCHLORIDE AND ACETAMINOPHEN 1 TABLET: 5; 325 TABLET ORAL at 05:32

## 2017-08-16 RX ADMIN — OXYCODONE HYDROCHLORIDE AND ACETAMINOPHEN 1 TABLET: 5; 325 TABLET ORAL at 17:06

## 2017-08-16 RX ADMIN — DEXAMETHASONE SODIUM PHOSPHATE 4 MG: 4 INJECTION, SOLUTION INTRAMUSCULAR; INTRAVENOUS at 01:31

## 2017-08-16 RX ADMIN — Medication 10 ML: at 09:08

## 2017-08-16 RX ADMIN — DEXAMETHASONE SODIUM PHOSPHATE 4 MG: 4 INJECTION, SOLUTION INTRAMUSCULAR; INTRAVENOUS at 09:07

## 2017-08-16 RX ADMIN — LISINOPRIL 40 MG: 20 TABLET ORAL at 09:07

## 2017-08-16 RX ADMIN — OXYCODONE HYDROCHLORIDE AND ACETAMINOPHEN 1 TABLET: 5; 325 TABLET ORAL at 21:39

## 2017-08-16 RX ADMIN — IPRATROPIUM BROMIDE 2 PUFF: 17 AEROSOL, METERED RESPIRATORY (INHALATION) at 08:05

## 2017-08-16 RX ADMIN — Medication 10 ML: at 21:37

## 2017-08-16 RX ADMIN — DOCUSATE SODIUM 100 MG: 100 CAPSULE ORAL at 09:07

## 2017-08-16 RX ADMIN — OXYCODONE HYDROCHLORIDE AND ACETAMINOPHEN 1 TABLET: 5; 325 TABLET ORAL at 12:59

## 2017-08-16 ASSESSMENT — ENCOUNTER SYMPTOMS
WHEEZING: 0
SHORTNESS OF BREATH: 0
NAUSEA: 0
VOMITING: 0
DIARRHEA: 0
ABDOMINAL PAIN: 0
CONSTIPATION: 0

## 2017-08-16 ASSESSMENT — PAIN SCALES - GENERAL
PAINLEVEL_OUTOF10: 9
PAINLEVEL_OUTOF10: 6
PAINLEVEL_OUTOF10: 9
PAINLEVEL_OUTOF10: 8
PAINLEVEL_OUTOF10: 6
PAINLEVEL_OUTOF10: 6

## 2017-08-17 VITALS
HEIGHT: 75 IN | BODY MASS INDEX: 27.36 KG/M2 | DIASTOLIC BLOOD PRESSURE: 63 MMHG | WEIGHT: 220.02 LBS | SYSTOLIC BLOOD PRESSURE: 118 MMHG | TEMPERATURE: 98.2 F | HEART RATE: 73 BPM | OXYGEN SATURATION: 96 % | RESPIRATION RATE: 16 BRPM

## 2017-08-17 PROBLEM — R53.81 PHYSICAL DECONDITIONING: Status: ACTIVE | Noted: 2017-08-17

## 2017-08-17 LAB
ABSOLUTE EOS #: 0 K/UL (ref 0–0.4)
ABSOLUTE LYMPH #: 2.3 K/UL (ref 1–4.8)
ABSOLUTE MONO #: 1.3 K/UL (ref 0.1–1.2)
ANION GAP SERPL CALCULATED.3IONS-SCNC: 15 MMOL/L (ref 9–17)
BASOPHILS # BLD: 0 %
BASOPHILS ABSOLUTE: 0 K/UL (ref 0–0.2)
BUN BLDV-MCNC: 15 MG/DL (ref 8–23)
BUN/CREAT BLD: ABNORMAL (ref 9–20)
CALCIUM SERPL-MCNC: 8.5 MG/DL (ref 8.6–10.4)
CHLORIDE BLD-SCNC: 101 MMOL/L (ref 98–107)
CO2: 23 MMOL/L (ref 20–31)
CREAT SERPL-MCNC: 0.62 MG/DL (ref 0.7–1.2)
DIFFERENTIAL TYPE: ABNORMAL
EOSINOPHILS RELATIVE PERCENT: 0 %
GFR AFRICAN AMERICAN: >60 ML/MIN
GFR NON-AFRICAN AMERICAN: >60 ML/MIN
GFR SERPL CREATININE-BSD FRML MDRD: ABNORMAL ML/MIN/{1.73_M2}
GFR SERPL CREATININE-BSD FRML MDRD: ABNORMAL ML/MIN/{1.73_M2}
GLUCOSE BLD-MCNC: 104 MG/DL (ref 75–110)
GLUCOSE BLD-MCNC: 99 MG/DL (ref 70–99)
HCT VFR BLD CALC: 37.9 % (ref 41–53)
HEMOGLOBIN: 13.1 G/DL (ref 13.5–17.5)
LYMPHOCYTES # BLD: 24 %
MCH RBC QN AUTO: 31.5 PG (ref 26–34)
MCHC RBC AUTO-ENTMCNC: 34.5 G/DL (ref 31–37)
MCV RBC AUTO: 91.4 FL (ref 80–100)
MONOCYTES # BLD: 13 %
PDW BLD-RTO: 14.7 % (ref 12.5–15.4)
PLATELET # BLD: 179 K/UL (ref 140–450)
PLATELET ESTIMATE: ABNORMAL
PMV BLD AUTO: 8.1 FL (ref 6–12)
POTASSIUM SERPL-SCNC: 3.6 MMOL/L (ref 3.7–5.3)
RBC # BLD: 4.14 M/UL (ref 4.5–5.9)
RBC # BLD: ABNORMAL 10*6/UL
SEG NEUTROPHILS: 63 %
SEGMENTED NEUTROPHILS ABSOLUTE COUNT: 6 K/UL (ref 1.8–7.7)
SODIUM BLD-SCNC: 139 MMOL/L (ref 135–144)
WBC # BLD: 9.6 K/UL (ref 3.5–11)
WBC # BLD: ABNORMAL 10*3/UL

## 2017-08-17 PROCEDURE — 85025 COMPLETE CBC W/AUTO DIFF WBC: CPT

## 2017-08-17 PROCEDURE — 99239 HOSP IP/OBS DSCHRG MGMT >30: CPT | Performed by: FAMILY MEDICINE

## 2017-08-17 PROCEDURE — 99221 1ST HOSP IP/OBS SF/LOW 40: CPT | Performed by: PHYSICAL MEDICINE & REHABILITATION

## 2017-08-17 PROCEDURE — 6370000000 HC RX 637 (ALT 250 FOR IP): Performed by: FAMILY MEDICINE

## 2017-08-17 PROCEDURE — 6370000000 HC RX 637 (ALT 250 FOR IP): Performed by: NURSE PRACTITIONER

## 2017-08-17 PROCEDURE — 36415 COLL VENOUS BLD VENIPUNCTURE: CPT

## 2017-08-17 PROCEDURE — 94762 N-INVAS EAR/PLS OXIMTRY CONT: CPT

## 2017-08-17 PROCEDURE — 99232 SBSQ HOSP IP/OBS MODERATE 35: CPT | Performed by: INTERNAL MEDICINE

## 2017-08-17 PROCEDURE — 80048 BASIC METABOLIC PNL TOTAL CA: CPT

## 2017-08-17 PROCEDURE — 82947 ASSAY GLUCOSE BLOOD QUANT: CPT

## 2017-08-17 PROCEDURE — 99024 POSTOP FOLLOW-UP VISIT: CPT | Performed by: NEUROLOGICAL SURGERY

## 2017-08-17 PROCEDURE — 97535 SELF CARE MNGMENT TRAINING: CPT

## 2017-08-17 PROCEDURE — 94640 AIRWAY INHALATION TREATMENT: CPT

## 2017-08-17 PROCEDURE — 6370000000 HC RX 637 (ALT 250 FOR IP): Performed by: NEUROLOGICAL SURGERY

## 2017-08-17 PROCEDURE — 6370000000 HC RX 637 (ALT 250 FOR IP): Performed by: PHYSICIAN ASSISTANT

## 2017-08-17 PROCEDURE — 2580000003 HC RX 258: Performed by: NURSE PRACTITIONER

## 2017-08-17 RX ORDER — OXYCODONE AND ACETAMINOPHEN 10; 325 MG/1; MG/1
1 TABLET ORAL EVERY 6 HOURS PRN
Qty: 30 TABLET | Refills: 0 | Status: SHIPPED | OUTPATIENT
Start: 2017-08-17 | End: 2017-08-24

## 2017-08-17 RX ORDER — NICOTINE 21 MG/24HR
1 PATCH, TRANSDERMAL 24 HOURS TRANSDERMAL DAILY
Qty: 30 PATCH | Refills: 3 | Status: SHIPPED | OUTPATIENT
Start: 2017-08-17 | End: 2017-09-26 | Stop reason: CLARIF

## 2017-08-17 RX ORDER — OXYCODONE HYDROCHLORIDE AND ACETAMINOPHEN 5; 325 MG/1; MG/1
1 TABLET ORAL EVERY 4 HOURS PRN
Status: DISCONTINUED | OUTPATIENT
Start: 2017-08-17 | End: 2017-08-17 | Stop reason: HOSPADM

## 2017-08-17 RX ORDER — PSEUDOEPHEDRINE HCL 30 MG
100 TABLET ORAL 2 TIMES DAILY
Qty: 60 CAPSULE | Refills: 0 | Status: SHIPPED | OUTPATIENT
Start: 2017-08-17

## 2017-08-17 RX ORDER — ALBUTEROL SULFATE 90 UG/1
2 AEROSOL, METERED RESPIRATORY (INHALATION) EVERY 6 HOURS PRN
Status: DISCONTINUED | OUTPATIENT
Start: 2017-08-17 | End: 2017-08-17 | Stop reason: HOSPADM

## 2017-08-17 RX ORDER — OXYCODONE HYDROCHLORIDE AND ACETAMINOPHEN 5; 325 MG/1; MG/1
2 TABLET ORAL EVERY 4 HOURS PRN
Status: DISCONTINUED | OUTPATIENT
Start: 2017-08-17 | End: 2017-08-17 | Stop reason: HOSPADM

## 2017-08-17 RX ORDER — CYCLOBENZAPRINE HCL 10 MG
10 TABLET ORAL 3 TIMES DAILY PRN
Qty: 30 TABLET | Refills: 0 | Status: SHIPPED | OUTPATIENT
Start: 2017-08-17 | End: 2017-08-27

## 2017-08-17 RX ADMIN — ALBUTEROL SULFATE 2 PUFF: 90 AEROSOL, METERED RESPIRATORY (INHALATION) at 08:43

## 2017-08-17 RX ADMIN — AMLODIPINE BESYLATE 10 MG: 10 TABLET ORAL at 08:20

## 2017-08-17 RX ADMIN — OXYCODONE HYDROCHLORIDE AND ACETAMINOPHEN 2 TABLET: 5; 325 TABLET ORAL at 13:51

## 2017-08-17 RX ADMIN — OXYCODONE HYDROCHLORIDE AND ACETAMINOPHEN 1 TABLET: 5; 325 TABLET ORAL at 03:56

## 2017-08-17 RX ADMIN — OXYCODONE HYDROCHLORIDE AND ACETAMINOPHEN 2 TABLET: 5; 325 TABLET ORAL at 18:25

## 2017-08-17 RX ADMIN — LISINOPRIL 40 MG: 20 TABLET ORAL at 08:20

## 2017-08-17 RX ADMIN — Medication 10 ML: at 08:21

## 2017-08-17 RX ADMIN — OXYCODONE HYDROCHLORIDE AND ACETAMINOPHEN 1 TABLET: 5; 325 TABLET ORAL at 08:20

## 2017-08-17 RX ADMIN — IPRATROPIUM BROMIDE 2 PUFF: 17 AEROSOL, METERED RESPIRATORY (INHALATION) at 08:43

## 2017-08-17 RX ADMIN — DOCUSATE SODIUM 100 MG: 100 CAPSULE ORAL at 08:20

## 2017-08-17 RX ADMIN — FAMOTIDINE 20 MG: 20 TABLET, FILM COATED ORAL at 08:20

## 2017-08-17 ASSESSMENT — PAIN SCALES - GENERAL
PAINLEVEL_OUTOF10: 9
PAINLEVEL_OUTOF10: 8
PAINLEVEL_OUTOF10: 9

## 2017-08-17 ASSESSMENT — ENCOUNTER SYMPTOMS
ORTHOPNEA: 0
CONSTIPATION: 0
NAUSEA: 0
VOMITING: 0
DIARRHEA: 0
WHEEZING: 0
HEARTBURN: 0
ABDOMINAL PAIN: 0
SHORTNESS OF BREATH: 0
COUGH: 1

## 2017-08-17 ASSESSMENT — PAIN DESCRIPTION - LOCATION: LOCATION: GENERALIZED;BACK;NECK

## 2017-08-17 ASSESSMENT — PAIN DESCRIPTION - PAIN TYPE: TYPE: SURGICAL PAIN

## 2017-08-21 ENCOUNTER — TELEPHONE (OUTPATIENT)
Dept: PULMONOLOGY | Age: 67
End: 2017-08-21

## 2017-08-24 ENCOUNTER — TELEPHONE (OUTPATIENT)
Dept: NEUROSURGERY | Age: 67
End: 2017-08-24

## 2017-08-25 ENCOUNTER — TELEPHONE (OUTPATIENT)
Dept: NEUROSURGERY | Age: 67
End: 2017-08-25

## 2017-08-25 RX ORDER — OXYCODONE HYDROCHLORIDE AND ACETAMINOPHEN 5; 325 MG/1; MG/1
2 TABLET ORAL EVERY 6 HOURS PRN
Qty: 20 TABLET | Refills: 0 | Status: SHIPPED | OUTPATIENT
Start: 2017-08-25 | End: 2017-08-30 | Stop reason: ALTCHOICE

## 2017-08-30 ENCOUNTER — OFFICE VISIT (OUTPATIENT)
Dept: NEUROSURGERY | Age: 67
End: 2017-08-30

## 2017-08-30 VITALS
WEIGHT: 206 LBS | HEART RATE: 105 BPM | SYSTOLIC BLOOD PRESSURE: 135 MMHG | HEIGHT: 75 IN | BODY MASS INDEX: 25.61 KG/M2 | DIASTOLIC BLOOD PRESSURE: 84 MMHG

## 2017-08-30 DIAGNOSIS — G95.9 CERVICAL MYELOPATHY (HCC): Primary | ICD-10-CM

## 2017-08-30 PROCEDURE — 99024 POSTOP FOLLOW-UP VISIT: CPT | Performed by: NEUROLOGICAL SURGERY

## 2017-08-30 RX ORDER — OXYCODONE AND ACETAMINOPHEN 10; 325 MG/1; MG/1
1 TABLET ORAL EVERY 6 HOURS PRN
Qty: 60 TABLET | Refills: 0 | Status: SHIPPED | OUTPATIENT
Start: 2017-08-30 | End: 2017-09-29

## 2017-08-30 RX ORDER — CYCLOBENZAPRINE HCL 10 MG
10 TABLET ORAL 3 TIMES DAILY PRN
Qty: 60 TABLET | Refills: 0 | Status: SHIPPED | OUTPATIENT
Start: 2017-08-30 | End: 2017-09-26 | Stop reason: ALTCHOICE

## 2017-08-30 NOTE — MR AVS SNAPSHOT
Hepatitis C screening is recommended for all adults regardless of risk factors born between [facility] at least once (lifetime) who have never been tested. 1950    Tetanus Combination Vaccine (1 - Tdap) 10/23/1967    Colonoscopy 10/23/1998    Zoster Vaccine 10/23/2008    Pneumococcal Vaccines (two) for all adults aged 72 and over (1 of 2 - PCV13) 10/23/2013    Yearly Flu Vaccine (1) 8/1/2017    Cholesterol Screening 8/13/2022            MyChart Signup           Etaphase allows you to send messages to your doctor, view your test results, renew your prescriptions, schedule appointments, view visit notes, and more. How Do I Sign Up? 1. In your Internet browser, go to [email or web address]  2. Click on the Sign Up Now link in the Sign In box. You will see the New Member Sign Up page. 3. Enter your Etaphase Access Code exactly as it appears below. You will not need to use this code after youve completed the sign-up process. If you do not sign up before the expiration date, you must request a new code. Etaphase Access Code: [email or web address]  Expires: 9/29/2017  4:56 AM    4. Enter your Social Security Number ([id number]) and Date of Birth (mm/dd/yyyy) as indicated and click Submit. You will be taken to the next sign-up page. 5. Create a Etaphase ID. This will be your Etaphase login ID and cannot be changed, so think of one that is secure and easy to remember. 6. Create a Etaphase password. You can change your password at any time. 7. Enter your Password Reset Question and Answer. This can be used at a later time if you forget your password. 8. Enter your e-mail address. You will receive e-mail notification when new information is available in [address] 9. Click Sign Up. You can now view your medical record. Additional Information  If you have questions, please contact the physician practice where you receive care. Remember, Etaphase is NOT to be used for urgent needs.  For

## 2017-08-30 NOTE — PROGRESS NOTES
Is doing very well postoperatively with completely improved  strength as well as resolved allodynia in his upper extremities he does have some deep aching in his neck tissues that otherwise has been stable. He did claim that he needed some more pain medication considering he had been out for the last few days which was understandable considering that he was only given 30 upon discharge. On exam he is 5 out 5 in all extremities no allodynia or hyperalgesia was present. Incisions completely healed and he did have a small suture to the drain site was removed. I did give him 60 percocet 10s as well as Valium 5 mg 60.

## 2017-09-26 ENCOUNTER — OFFICE VISIT (OUTPATIENT)
Dept: PULMONOLOGY | Age: 67
End: 2017-09-26
Payer: MEDICARE

## 2017-09-26 VITALS — OXYGEN SATURATION: 100 % | BODY MASS INDEX: 25.49 KG/M2 | HEIGHT: 75 IN | HEART RATE: 91 BPM | WEIGHT: 205 LBS

## 2017-09-26 VITALS
WEIGHT: 204 LBS | TEMPERATURE: 97.6 F | HEIGHT: 75 IN | BODY MASS INDEX: 25.36 KG/M2 | DIASTOLIC BLOOD PRESSURE: 81 MMHG | HEART RATE: 100 BPM | SYSTOLIC BLOOD PRESSURE: 127 MMHG | OXYGEN SATURATION: 100 % | RESPIRATION RATE: 18 BRPM

## 2017-09-26 DIAGNOSIS — J43.2 CENTRILOBULAR EMPHYSEMA (HCC): Primary | ICD-10-CM

## 2017-09-26 DIAGNOSIS — J98.11 ATELECTASIS: ICD-10-CM

## 2017-09-26 DIAGNOSIS — R91.1 LUNG NODULE: ICD-10-CM

## 2017-09-26 DIAGNOSIS — R06.00 DYSPNEA, UNSPECIFIED TYPE: Primary | ICD-10-CM

## 2017-09-26 PROCEDURE — 94729 DIFFUSING CAPACITY: CPT | Performed by: INTERNAL MEDICINE

## 2017-09-26 PROCEDURE — 94726 PLETHYSMOGRAPHY LUNG VOLUMES: CPT | Performed by: INTERNAL MEDICINE

## 2017-09-26 PROCEDURE — 4004F PT TOBACCO SCREEN RCVD TLK: CPT | Performed by: INTERNAL MEDICINE

## 2017-09-26 PROCEDURE — G8427 DOCREV CUR MEDS BY ELIG CLIN: HCPCS | Performed by: INTERNAL MEDICINE

## 2017-09-26 PROCEDURE — 3023F SPIROM DOC REV: CPT | Performed by: INTERNAL MEDICINE

## 2017-09-26 PROCEDURE — 99215 OFFICE O/P EST HI 40 MIN: CPT | Performed by: INTERNAL MEDICINE

## 2017-09-26 PROCEDURE — 3017F COLORECTAL CA SCREEN DOC REV: CPT | Performed by: INTERNAL MEDICINE

## 2017-09-26 PROCEDURE — 1123F ACP DISCUSS/DSCN MKR DOCD: CPT | Performed by: INTERNAL MEDICINE

## 2017-09-26 PROCEDURE — 4040F PNEUMOC VAC/ADMIN/RCVD: CPT | Performed by: INTERNAL MEDICINE

## 2017-09-26 PROCEDURE — G8419 CALC BMI OUT NRM PARAM NOF/U: HCPCS | Performed by: INTERNAL MEDICINE

## 2017-09-26 PROCEDURE — G8926 SPIRO NO PERF OR DOC: HCPCS | Performed by: INTERNAL MEDICINE

## 2017-09-26 PROCEDURE — 94375 RESPIRATORY FLOW VOLUME LOOP: CPT | Performed by: INTERNAL MEDICINE

## 2017-10-11 ENCOUNTER — TELEPHONE (OUTPATIENT)
Dept: PULMONOLOGY | Age: 67
End: 2017-10-11

## 2017-10-11 NOTE — TELEPHONE ENCOUNTER
CXR ordered by Dr. Sukhwinder Abbott on 9/26/17 fell into the overdue results in basket. I called patient and left a message on machine asking for a call back. Our phone number was provided.

## 2017-10-12 ENCOUNTER — HOSPITAL ENCOUNTER (OUTPATIENT)
Dept: GENERAL RADIOLOGY | Age: 67
Discharge: HOME | End: 2017-10-12
Payer: MEDICARE

## 2017-10-12 ENCOUNTER — HOSPITAL ENCOUNTER (OUTPATIENT)
Age: 67
Discharge: HOME | End: 2017-10-12
Payer: MEDICARE

## 2017-10-12 DIAGNOSIS — J98.11 ATELECTASIS: ICD-10-CM

## 2017-10-12 PROBLEM — K59.00 CONSTIPATION: Status: ACTIVE | Noted: 2017-10-12

## 2017-10-12 PROCEDURE — 71020 XR CHEST STANDARD TWO VW: CPT

## 2017-10-20 ENCOUNTER — TELEPHONE (OUTPATIENT)
Dept: NEUROSURGERY | Age: 67
End: 2017-10-20

## 2017-10-20 DIAGNOSIS — M43.22 FUSION OF SPINE, CERVICAL REGION: Primary | ICD-10-CM

## 2017-10-25 ENCOUNTER — OFFICE VISIT (OUTPATIENT)
Dept: NEUROSURGERY | Age: 67
End: 2017-10-25

## 2017-10-25 ENCOUNTER — HOSPITAL ENCOUNTER (OUTPATIENT)
Age: 67
Discharge: HOME | End: 2017-10-25
Payer: MEDICARE

## 2017-10-25 ENCOUNTER — HOSPITAL ENCOUNTER (OUTPATIENT)
Dept: GENERAL RADIOLOGY | Age: 67
Discharge: HOME | End: 2017-10-25
Payer: MEDICARE

## 2017-10-25 VITALS
HEART RATE: 87 BPM | BODY MASS INDEX: 26.73 KG/M2 | SYSTOLIC BLOOD PRESSURE: 143 MMHG | HEIGHT: 75 IN | DIASTOLIC BLOOD PRESSURE: 84 MMHG | WEIGHT: 215 LBS

## 2017-10-25 DIAGNOSIS — M48.02 STENOSIS OF CERVICAL SPINE WITH MYELOPATHY (HCC): Primary | ICD-10-CM

## 2017-10-25 DIAGNOSIS — S14.129D CENTRAL CORD SYNDROME, SUBSEQUENT ENCOUNTER (HCC): ICD-10-CM

## 2017-10-25 DIAGNOSIS — H81.10 BPPV (BENIGN PAROXYSMAL POSITIONAL VERTIGO), UNSPECIFIED LATERALITY: ICD-10-CM

## 2017-10-25 DIAGNOSIS — M43.22 FUSION OF SPINE, CERVICAL REGION: ICD-10-CM

## 2017-10-25 DIAGNOSIS — G99.2 STENOSIS OF CERVICAL SPINE WITH MYELOPATHY (HCC): Primary | ICD-10-CM

## 2017-10-25 PROCEDURE — 72040 X-RAY EXAM NECK SPINE 2-3 VW: CPT

## 2017-10-25 PROCEDURE — 99024 POSTOP FOLLOW-UP VISIT: CPT | Performed by: NEUROLOGICAL SURGERY

## 2017-10-25 RX ORDER — MECLIZINE HCL 12.5 MG/1
12.5 TABLET ORAL 3 TIMES DAILY PRN
COMMUNITY

## 2017-10-25 NOTE — PROGRESS NOTES
Exam the patient bedside. He states that he has had complete resolution of the allodynia of his upper extremities and has no neck pain. She is having some residual numbness in his distal fingertips bilaterally. Even this he states has improved slowly over time but it is still present and is impeding his ability to continue to do would work. He is having some discrimination more so related to vertical and is requiring to persistently use a cane. On exam he is awake alert oriented ×3 cranial nerves II through XII are intact he is 5 out of 5 in all extremities including  and intrinsics. He does not have any allodynia but does have diminished sensation in his distal upper extremities bilaterally all digits. Does have some long track signs in his upper as well as lower extremities. Incision is completely healed. X-rays show stable alignment and fixation of C3 7 posterior cervical hardware. Cervical stenosis with myelopathy  Central cord syndrome SONYA [place]  BPPV    I discussed with him that considering his clinical course has been dramatically improving over the course of the last 2 months I do not expect that he needs any other imaging at this time. I splinted him that neurological injury even with central cord syndrome does require timeframe of 6 months to a year to really see the true benefit of rehabilitation. At this point and advised him to continue to monitor symptoms for improvement, plateau or worsening. If he worsens he is to call the office and be seen sooner in the period of 2-3 months if he notices that he has plateaued and is not improving we can reimaging him with a CT monogram or MRI C-spine. Regarding his BPPV I will refer him to physical therapy for vestibular rehab. In the event that he is still having issues with this does not help I have asked him to reach out to his PCP for a potential referral to ENT.       When necessary follow-up

## 2017-11-22 ENCOUNTER — HOSPITAL ENCOUNTER (EMERGENCY)
Age: 67
Discharge: HOME | End: 2017-11-22
Attending: EMERGENCY MEDICINE
Payer: MEDICARE

## 2017-11-22 VITALS
TEMPERATURE: 97.9 F | OXYGEN SATURATION: 100 % | WEIGHT: 220 LBS | DIASTOLIC BLOOD PRESSURE: 73 MMHG | RESPIRATION RATE: 18 BRPM | HEIGHT: 75 IN | BODY MASS INDEX: 27.35 KG/M2 | HEART RATE: 89 BPM | SYSTOLIC BLOOD PRESSURE: 123 MMHG

## 2017-11-22 DIAGNOSIS — S76.212A GROIN STRAIN, LEFT, INITIAL ENCOUNTER: Primary | ICD-10-CM

## 2017-11-22 PROCEDURE — 93971 EXTREMITY STUDY: CPT

## 2017-11-22 PROCEDURE — 99284 EMERGENCY DEPT VISIT MOD MDM: CPT

## 2017-11-22 PROCEDURE — 6370000000 HC RX 637 (ALT 250 FOR IP): Performed by: EMERGENCY MEDICINE

## 2017-11-22 RX ORDER — HYDROCODONE BITARTRATE AND ACETAMINOPHEN 5; 325 MG/1; MG/1
2 TABLET ORAL ONCE
Status: COMPLETED | OUTPATIENT
Start: 2017-11-22 | End: 2017-11-22

## 2017-11-22 RX ADMIN — HYDROCODONE BITARTRATE AND ACETAMINOPHEN 2 TABLET: 5; 325 TABLET ORAL at 10:27

## 2017-11-22 ASSESSMENT — PAIN SCALES - GENERAL
PAINLEVEL_OUTOF10: 10
PAINLEVEL_OUTOF10: 10

## 2017-11-22 ASSESSMENT — ENCOUNTER SYMPTOMS
EYE REDNESS: 0
RHINORRHEA: 0
SHORTNESS OF BREATH: 0
EYE PAIN: 0
EYE DISCHARGE: 0
DIARRHEA: 0
ABDOMINAL PAIN: 0
COUGH: 0
VOMITING: 0
BACK PAIN: 0

## 2017-11-22 ASSESSMENT — PAIN DESCRIPTION - DESCRIPTORS: DESCRIPTORS: SHOOTING;SHARP

## 2017-11-22 NOTE — ED PROVIDER NOTES
[address]  eMERGENCY dEPARTMENT eNCOUnter      Pt Name: Bert Hernandez  MRN: [id number]  [place] 1950  Date of evaluation: 11/22/2017  PCP:    Niharika Lorenzo MD      [address] [place] [address]       Chief Complaint   Patient presents with    Leg Pain     Left         HISTORY OF PRESENT ILLNESS   HPI   Bert Hernandez is a 69 y.o. male who presents For pain in the left leg. Patient arrived via EMS are pale left leg. Started last night. States unsure what is going on. Starts the groin ago sent to the calf. More on the top and the inside part of his leg. Denies any abdominal pain or back pain. Is never had this before. No specific trauma. No skin changes. Unsure what is going on. Otherwise no other complaints       REVIEW OF SYSTEMS         Review of Systems   Constitutional: Negative for chills and fever. HENT: Negative for congestion and rhinorrhea. Eyes: Negative for pain, discharge and redness. Respiratory: Negative for cough and shortness of breath. Cardiovascular: Negative for chest pain. Gastrointestinal: Negative for abdominal pain, diarrhea and vomiting. Genitourinary: Negative for dysuria and hematuria. Musculoskeletal: Negative for arthralgias, back pain, myalgias, neck pain and neck stiffness. Left leg pain   Skin: Negative for rash. Neurological: Negative for light-headedness and headaches. Hematological: Does not bruise/bleed easily.           PAST MEDICAL HISTORY     Past Medical History:   Diagnosis Date    H/O unilateral nephrectomy     Hypertension        SURGICAL HISTORY       Past Surgical History:   Procedure Laterality Date    CERVICAL FUSION N/A 08/14/2017    POSTERIOR CERVICAL FUSION C3-C7    CERVICAL FUSION N/A 8/14/2017    POSTERIOR CERVICAL DECOMPRESSION AND  FUSION C3-C7, C-ARM, EVOKES (CONFIRMATION-THANG BAINS), SYNTHES (OFFICE TO NOTIFY REP) performed by Lizzette Costa DO at [address] [place] [address]     TOTAL of motion. Neck supple. Cardiovascular: Normal rate, regular rhythm, normal heart sounds and intact distal pulses. Pulmonary/Chest: Effort normal and breath sounds normal. No respiratory distress. He has no wheezes. Abdominal: Soft. Bowel sounds are normal. He exhibits no distension. There is no tenderness. Musculoskeletal: Normal range of motion. Patient with focal tenderness from the inner aspect of the groin down due to her to the knee. There are no skin changes. Some external rotation and flexion of the hip does cause the pain. Patient patient with a palpable pulse pulse in the femoral and dorsalis pedis. Skin is warm and dry. There are symmetrical left right. There is no swelling   Neurological: He is alert and oriented to person, place, and time. Skin: Skin is warm and dry. Nursing note and vitals reviewed. DIFFERENTIAL DIAGNOSIS/ MDM:     Patient with unspecified left leg. However does appear to be more muscular skeletal.  However patient's concern about a blood clot a Doppler be obtained. Good arterial pulses noted    1030: Doppler. unremarkable. Patient stable for discharge. DIAGNOSTIC RESULTS       RADIOLOGY:   I directly visualized the following  images and reviewed the radiologist interpretations:  VL DUP LOWER EXTREMITY VENOUS LEFT    (Results Pending)          EMERGENCY DEPARTMENT COURSE:   Vitals:    Vitals:    11/22/17 0925   BP: (!) 176/89   Pulse: 99   Resp: 18   Temp: 97.9 °F (36.6 °C)   TempSrc: Oral   SpO2: 99%   Weight: 220 lb (99.8 kg)   Height: 6' 3\" (1.905 m)     -------------------------  BP: (!) 176/89, Temp: 97.9 °F (36.6 °C), Pulse: 99, Resp: 18      FINAL IMPRESSION      1.  Groin strain, left, initial encounter          DISPOSITION/PLAN    DISPOSITION Decision to Discharge    PATIENT REFERRED TO:  Cesar Mari MD  [address] [place] [address] [facility] [phone]    Call in 1 day        DISCHARGE MEDICATIONS:  New Prescriptions    No

## 2017-11-22 NOTE — ED NOTES
Pt presents in ED c/o left leg pain. Pt reported he walked to bed last night with no issues and did not have pain in leg. Pt reported in the middle of the night he was restless and had a shooting and sharp pain that started in his groin and radiates down left leg. No injury or trauma reported. No swelling or deformity noted. All pulses palpable and leg is consistent with right leg in temperature and size. Pt reported he has numbness in digits in left foot per normal due to a circulatory problem. Pt is eupneic, A&OX4, and PWD. Call light in reach.          Bulmaro Jiang RN  11/22/17 5838

## 2018-06-11 ENCOUNTER — HOSPITAL ENCOUNTER (OUTPATIENT)
Age: 68
Discharge: HOME | End: 2018-06-11
Payer: MEDICARE

## 2018-06-11 LAB — HEPATITIS C ANTIBODY: NONREACTIVE

## 2018-06-11 PROCEDURE — 86803 HEPATITIS C AB TEST: CPT

## 2018-06-11 PROCEDURE — 36415 COLL VENOUS BLD VENIPUNCTURE: CPT

## 2018-07-16 ENCOUNTER — HOSPITAL ENCOUNTER (OUTPATIENT)
Dept: CT IMAGING | Age: 68
Discharge: HOME | End: 2018-07-18
Payer: MEDICARE

## 2018-07-16 DIAGNOSIS — R91.1 LUNG NODULE: ICD-10-CM

## 2018-07-16 PROCEDURE — 71250 CT THORAX DX C-: CPT

## 2018-12-04 ENCOUNTER — HOSPITAL ENCOUNTER (OUTPATIENT)
Age: 68
Discharge: HOME | End: 2018-12-04
Payer: MEDICARE

## 2018-12-04 LAB
ABSOLUTE EOS #: 0.3 K/UL (ref 0–0.4)
ABSOLUTE IMMATURE GRANULOCYTE: ABNORMAL K/UL (ref 0–0.3)
ABSOLUTE LYMPH #: 1.7 K/UL (ref 1–4.8)
ABSOLUTE MONO #: 0.8 K/UL (ref 0.1–1.3)
ALBUMIN SERPL-MCNC: 4.3 G/DL (ref 3.5–5.2)
ALBUMIN/GLOBULIN RATIO: ABNORMAL (ref 1–2.5)
ALP BLD-CCNC: 63 U/L (ref 40–129)
ALT SERPL-CCNC: 13 U/L (ref 5–41)
ANION GAP SERPL CALCULATED.3IONS-SCNC: 13 MMOL/L (ref 9–17)
AST SERPL-CCNC: 14 U/L
BASOPHILS # BLD: 1 % (ref 0–2)
BASOPHILS ABSOLUTE: 0.1 K/UL (ref 0–0.2)
BILIRUB SERPL-MCNC: 0.46 MG/DL (ref 0.3–1.2)
BUN BLDV-MCNC: 12 MG/DL (ref 8–23)
BUN/CREAT BLD: ABNORMAL (ref 9–20)
C-REACTIVE PROTEIN: 3.4 MG/L (ref 0–5)
CALCIUM SERPL-MCNC: 9.4 MG/DL (ref 8.6–10.4)
CCP IGG ANTIBODIES: >200 U/ML
CHLORIDE BLD-SCNC: 102 MMOL/L (ref 98–107)
CO2: 23 MMOL/L (ref 20–31)
CREAT SERPL-MCNC: 1.01 MG/DL (ref 0.7–1.2)
DIFFERENTIAL TYPE: ABNORMAL
EOSINOPHILS RELATIVE PERCENT: 5 % (ref 0–4)
GFR AFRICAN AMERICAN: >60 ML/MIN
GFR NON-AFRICAN AMERICAN: >60 ML/MIN
GFR SERPL CREATININE-BSD FRML MDRD: ABNORMAL ML/MIN/{1.73_M2}
GFR SERPL CREATININE-BSD FRML MDRD: ABNORMAL ML/MIN/{1.73_M2}
GLUCOSE BLD-MCNC: 101 MG/DL (ref 70–99)
HCT VFR BLD CALC: 42.9 % (ref 41–53)
HEMOGLOBIN: 14.6 G/DL (ref 13.5–17.5)
IMMATURE GRANULOCYTES: ABNORMAL %
LYMPHOCYTES # BLD: 27 % (ref 24–44)
MCH RBC QN AUTO: 31.3 PG (ref 26–34)
MCHC RBC AUTO-ENTMCNC: 33.9 G/DL (ref 31–37)
MCV RBC AUTO: 92.1 FL (ref 80–100)
MONOCYTES # BLD: 12 % (ref 1–7)
NRBC AUTOMATED: ABNORMAL PER 100 WBC
PDW BLD-RTO: 15.2 % (ref 11.5–14.9)
PLATELET # BLD: 220 K/UL (ref 150–450)
PLATELET ESTIMATE: ABNORMAL
PMV BLD AUTO: 7.7 FL (ref 6–12)
POTASSIUM SERPL-SCNC: 4.6 MMOL/L (ref 3.7–5.3)
RBC # BLD: 4.65 M/UL (ref 4.5–5.9)
RBC # BLD: ABNORMAL 10*6/UL
RHEUMATOID FACTOR: 27.5 IU/ML
SEDIMENTATION RATE, ERYTHROCYTE: 18 MM (ref 0–15)
SEG NEUTROPHILS: 55 % (ref 36–66)
SEGMENTED NEUTROPHILS ABSOLUTE COUNT: 3.6 K/UL (ref 1.3–9.1)
SODIUM BLD-SCNC: 138 MMOL/L (ref 135–144)
TOTAL PROTEIN: 8.2 G/DL (ref 6.4–8.3)
WBC # BLD: 6.4 K/UL (ref 3.5–11)
WBC # BLD: ABNORMAL 10*3/UL

## 2018-12-04 PROCEDURE — 85651 RBC SED RATE NONAUTOMATED: CPT

## 2018-12-04 PROCEDURE — 80053 COMPREHEN METABOLIC PANEL: CPT

## 2018-12-04 PROCEDURE — 86431 RHEUMATOID FACTOR QUANT: CPT

## 2018-12-04 PROCEDURE — 86140 C-REACTIVE PROTEIN: CPT

## 2018-12-04 PROCEDURE — 36415 COLL VENOUS BLD VENIPUNCTURE: CPT

## 2018-12-04 PROCEDURE — 86038 ANTINUCLEAR ANTIBODIES: CPT

## 2018-12-04 PROCEDURE — 86200 CCP ANTIBODY: CPT

## 2018-12-04 PROCEDURE — 85025 COMPLETE CBC W/AUTO DIFF WBC: CPT

## 2018-12-05 LAB — ANTI-NUCLEAR ANTIBODY (ANA): NEGATIVE

## 2019-01-01 ENCOUNTER — HOSPITAL ENCOUNTER (OUTPATIENT)
Dept: CT IMAGING | Age: 69
Discharge: HOME | End: 2019-09-12
Payer: MEDICARE

## 2019-01-01 ENCOUNTER — TELEPHONE (OUTPATIENT)
Dept: ONCOLOGY | Age: 69
End: 2019-01-01

## 2019-01-01 ENCOUNTER — HOSPITAL ENCOUNTER (OUTPATIENT)
Age: 69
Discharge: HOME | End: 2019-06-25
Payer: MEDICARE

## 2019-01-01 DIAGNOSIS — Z87.891 PERSONAL HISTORY OF TOBACCO USE, PRESENTING HAZARDS TO HEALTH: ICD-10-CM

## 2019-01-01 LAB
ALT SERPL-CCNC: 12 U/L (ref 5–41)
ANION GAP SERPL CALCULATED.3IONS-SCNC: 14 MMOL/L (ref 9–17)
BUN BLDV-MCNC: 12 MG/DL (ref 8–23)
BUN/CREAT BLD: NORMAL (ref 9–20)
CALCIUM SERPL-MCNC: 9.5 MG/DL (ref 8.6–10.4)
CHLORIDE BLD-SCNC: 102 MMOL/L (ref 98–107)
CHOLESTEROL/HDL RATIO: 2.5
CHOLESTEROL: 163 MG/DL
CO2: 21 MMOL/L (ref 20–31)
CREAT SERPL-MCNC: 0.98 MG/DL (ref 0.7–1.2)
GFR AFRICAN AMERICAN: >60 ML/MIN
GFR NON-AFRICAN AMERICAN: >60 ML/MIN
GFR SERPL CREATININE-BSD FRML MDRD: NORMAL ML/MIN/{1.73_M2}
GFR SERPL CREATININE-BSD FRML MDRD: NORMAL ML/MIN/{1.73_M2}
GLUCOSE BLD-MCNC: 84 MG/DL (ref 70–99)
HDLC SERPL-MCNC: 65 MG/DL
LDL CHOLESTEROL: 79 MG/DL (ref 0–130)
POTASSIUM SERPL-SCNC: 4.9 MMOL/L (ref 3.7–5.3)
SODIUM BLD-SCNC: 137 MMOL/L (ref 135–144)
T3 FREE: 2.8 PG/ML (ref 2.02–4.43)
THYROXINE, FREE: 1.23 NG/DL (ref 0.93–1.7)
TRIGL SERPL-MCNC: 97 MG/DL
TSH SERPL DL<=0.05 MIU/L-ACNC: 0.21 MIU/L (ref 0.3–5)
VLDLC SERPL CALC-MCNC: NORMAL MG/DL (ref 1–30)

## 2019-01-01 PROCEDURE — 36415 COLL VENOUS BLD VENIPUNCTURE: CPT

## 2019-01-01 PROCEDURE — 84481 FREE ASSAY (FT-3): CPT

## 2019-01-01 PROCEDURE — 84460 ALANINE AMINO (ALT) (SGPT): CPT

## 2019-01-01 PROCEDURE — 71250 CT THORAX DX C-: CPT

## 2019-01-01 PROCEDURE — 84439 ASSAY OF FREE THYROXINE: CPT

## 2019-01-01 PROCEDURE — 80048 BASIC METABOLIC PNL TOTAL CA: CPT

## 2019-01-01 PROCEDURE — 80061 LIPID PANEL: CPT

## 2019-01-01 PROCEDURE — 84443 ASSAY THYROID STIM HORMONE: CPT

## 2020-01-01 ENCOUNTER — HOSPITAL ENCOUNTER (EMERGENCY)
Age: 70
End: 2020-03-05
Attending: EMERGENCY MEDICINE
Payer: MEDICARE

## 2020-01-01 VITALS
SYSTOLIC BLOOD PRESSURE: 144 MMHG | RESPIRATION RATE: 10 BRPM | HEART RATE: 85 BPM | DIASTOLIC BLOOD PRESSURE: 59 MMHG | OXYGEN SATURATION: 58 %

## 2020-01-01 LAB
ALLEN TEST: ABNORMAL
ANION GAP: 18 MMOL/L (ref 7–16)
FIO2: ABNORMAL
GFR NON-AFRICAN AMERICAN: 49 ML/MIN
GFR SERPL CREATININE-BSD FRML MDRD: 60 ML/MIN
GFR SERPL CREATININE-BSD FRML MDRD: ABNORMAL ML/MIN/{1.73_M2}
GLUCOSE BLD-MCNC: 256 MG/DL (ref 74–100)
HCO3 VENOUS: 12.2 MMOL/L (ref 22–29)
MODE: ABNORMAL
NEGATIVE BASE EXCESS, VEN: 23 (ref 0–2)
O2 DEVICE/FLOW/%: ABNORMAL
O2 SAT, VEN: 9 % (ref 60–85)
PATIENT TEMP: ABNORMAL
PCO2, VEN: 75.8 MM HG (ref 41–51)
PH VENOUS: 6.82 (ref 7.32–7.43)
PO2, VEN: 17.1 MM HG (ref 30–50)
POC CHLORIDE: 105 MMOL/L (ref 98–107)
POC CREATININE: 1.42 MG/DL (ref 0.51–1.19)
POC HEMATOCRIT: 37 % (ref 41–53)
POC HEMOGLOBIN: 12.5 G/DL (ref 13.5–17.5)
POC IONIZED CALCIUM: 1.25 MMOL/L (ref 1.15–1.33)
POC LACTIC ACID: 10.06 MMOL/L (ref 0.56–1.39)
POC PCO2 TEMP: ABNORMAL MM HG
POC PH TEMP: ABNORMAL
POC PO2 TEMP: ABNORMAL MM HG
POC POTASSIUM: 5 MMOL/L (ref 3.5–4.5)
POC SODIUM: 135 MMOL/L (ref 138–146)
POSITIVE BASE EXCESS, VEN: ABNORMAL (ref 0–3)
SAMPLE SITE: ABNORMAL
TOTAL CO2, VENOUS: 15 MMOL/L (ref 23–30)

## 2020-01-01 PROCEDURE — 99285 EMERGENCY DEPT VISIT HI MDM: CPT

## 2020-01-01 PROCEDURE — 96374 THER/PROPH/DIAG INJ IV PUSH: CPT

## 2020-01-01 PROCEDURE — 82803 BLOOD GASES ANY COMBINATION: CPT

## 2020-01-01 PROCEDURE — 84132 ASSAY OF SERUM POTASSIUM: CPT

## 2020-01-01 PROCEDURE — 82330 ASSAY OF CALCIUM: CPT

## 2020-01-01 PROCEDURE — 96375 TX/PRO/DX INJ NEW DRUG ADDON: CPT

## 2020-01-01 PROCEDURE — 83605 ASSAY OF LACTIC ACID: CPT

## 2020-01-01 PROCEDURE — 82565 ASSAY OF CREATININE: CPT

## 2020-01-01 PROCEDURE — 85014 HEMATOCRIT: CPT

## 2020-01-01 PROCEDURE — 6360000002 HC RX W HCPCS: Performed by: EMERGENCY MEDICINE

## 2020-01-01 PROCEDURE — 2500000003 HC RX 250 WO HCPCS: Performed by: EMERGENCY MEDICINE

## 2020-01-01 PROCEDURE — 82947 ASSAY GLUCOSE BLOOD QUANT: CPT

## 2020-01-01 PROCEDURE — 82435 ASSAY OF BLOOD CHLORIDE: CPT

## 2020-01-01 PROCEDURE — 84295 ASSAY OF SERUM SODIUM: CPT

## 2020-01-01 RX ORDER — CALCIUM CHLORIDE 100 MG/ML
INJECTION INTRAVENOUS; INTRAVENTRICULAR DAILY PRN
Status: COMPLETED | OUTPATIENT
Start: 2020-01-01 | End: 2020-01-01

## 2020-01-01 RX ORDER — MAGNESIUM SULFATE HEPTAHYDRATE 40 MG/ML
INJECTION, SOLUTION INTRAVENOUS CONTINUOUS PRN
Status: COMPLETED | OUTPATIENT
Start: 2020-01-01 | End: 2020-01-01

## 2020-01-01 RX ADMIN — EPINEPHRINE 1 MG: 0.1 INJECTION INTRACARDIAC; INTRAVENOUS at 07:26

## 2020-01-01 RX ADMIN — MAGNESIUM SULFATE IN WATER 2 G: 40 INJECTION, SOLUTION INTRAVENOUS at 07:29

## 2020-01-01 RX ADMIN — EPINEPHRINE 1 MG: 0.1 INJECTION INTRACARDIAC; INTRAVENOUS at 07:31

## 2020-01-01 RX ADMIN — EPINEPHRINE 1 MG: 0.1 INJECTION INTRACARDIAC; INTRAVENOUS at 07:21

## 2020-01-01 RX ADMIN — CALCIUM CHLORIDE 1 G: 100 INJECTION, SOLUTION INTRAVENOUS; INTRAVENTRICULAR at 07:21

## 2020-01-01 RX ADMIN — EPINEPHRINE 1 MG: 0.1 INJECTION INTRACARDIAC; INTRAVENOUS at 07:27

## 2020-01-01 RX ADMIN — EPINEPHRINE 1 MG: 0.1 INJECTION INTRACARDIAC; INTRAVENOUS at 07:17

## 2020-03-05 NOTE — ED PROVIDER NOTES
patient arrived actively with [facility] device providing compressions, I gel in place with diminished breath sounds bilaterally. Pallor and duskiness noted to the extremities. Pupils fixed. Abdomen soft nondistended no masses. No signs of trauma to the extremities or the head. Impression: Cardiac arrest secondary to respiratory arrest    Plan: Patient presented and respiratory arrest.  Dr. Izaiah Acosta intubated using direct and bougie with first-pass success. Equal breath sounds bilaterally but severely diminished with good color change on capnography. We did briefly have [place] approximately 3 minutes. Attempted to start max dose epi drip and did give a push dose epi 20 mcg however patient quickly degenerated back into PEA and CPR was resumed. Additionally 2 g of mag given as well as aerosols and line through ET tube due to suspected COPD/respiratory arrest.  Bicarb withheld due to potential worsening of respiratory acidosis with bicarb byproducts. Multiple rounds of epinephrine given. Bedside ultrasound shows minimal organized cardiac activity. Given prolonged downtime between EMS and ED and likelihood of neurologic recovery being less than 1% decision was made to terminate resuscitative efforts. Time of death 7:35 AM    EKG Interpretation  Unable to obtain      CRITICAL CARE: There was a high probability of clinically significant/life threatening deterioration in this patient's condition which required my urgent intervention. Total critical care time was 34 minutes. This excludes any time for separately reportable procedures.      Contreras Lucas MD, [facility]  Attending Emergency Physician        Rojas Araujo MD  03/05/20 7297

## 2020-03-05 NOTE — ED NOTES
Writer accessed chart after calling dispatch to obtain address of pt  so [employer] could go and notify significant other due to no contact information. Found to be inaccurate name of patient. Correct name obtained, registration notified and chart merged to be accurate. Anshul Denton notified of family contact information, emergency contact to be notified.       Luis Madison RN  03/05/20 6017

## 2020-03-05 NOTE — ED NOTES
Pt arrived via EMS after wife called d/t difficulty breathing. PTA, pt was intubated and arrested and placed on louis. Upon arrival to ED, CPR continued.       Carlos Nelson RN  03/05/20 5289

## 2020-03-05 NOTE — ED PROVIDER NOTES
[facility] ED  Emergency Department Encounter  Emergency Medicine Resident     Pt Name: Shyam Sharp  MRN: [id number]  [place] 1950  Date ofevaluation: 3/5/20  PCP:  No primary care provider on file. CHIEF COMPLAINT       No chief complaint on file. HISTORY OF PRESENT ILLNESS  (Location/Symptom, Timing/Onset, Context/Setting, Quality, Duration, Modifying Factors, Severity, Associated signs/symptoms)     Shyam Sharp is a 68 y.o. male who presents with respiratory arrest for proceeding into cardiac arrest.  EMS was called to patient's house where he was found to be wheezing and severe respiratory distress. Upon transfer to the stretcher he lost pulses, and was intubated. Capnography was lost in route and the ET tube was changed to an eye gel.  2 rounds of epinephrine were given prior to hospital arrival.    [facility] 60 / SURGICAL / SOCIAL / FAMILY HISTORY      has no past medical history on file. has no past surgical history on file.     Social History     Socioeconomic History    Marital status: Not on file     Spouse name: Not on file    Number of children: Not on file    Years of education: Not on file    Highest education level: Not on file   Occupational History    Not on file   Social Needs    Financial resource strain: Not on file    Food insecurity:     Worry: Not on file     Inability: Not on file    Transportation needs:     Medical: Not on file     Non-medical: Not on file   Tobacco Use    Smoking status: Not on file   Substance and Sexual Activity    Alcohol use: Not on file    Drug use: Not on file    Sexual activity: Not on file   Lifestyle    Physical activity:     Days per week: Not on file     Minutes per session: Not on file    Stress: Not on file   Relationships    Social connections:     Talks on phone: Not on file     Gets together: Not on file     Attends Pentecostalism service: Not on file     Active member of club or organization: Not on file Attends meetings of clubs or organizations: Not on file     Relationship status: Not on file    Intimate partner violence:     Fear of current or ex partner: Not on file     Emotionally abused: Not on file     Physically abused: Not on file     Forced sexual activity: Not on file   Other Topics Concern    Not on file   Social History Narrative    Not on file     No family history on file. Allergies:  Patient has no allergy information on record. Home Medications:  Prior to Admission medications    Not on File       REVIEW OF SYSTEMS    (2-9 systems for level 4, 10 or more for level 5)      Review of Systems   Unable to perform ROS: Acuity of condition     PHYSICAL EXAM   (up to 7 for level 4, 8 or more for level 5)      INITIAL VITALS:   There were no vitals taken for this visit. Physical Exam  Vitals signs reviewed. Constitutional:       General: He is in acute distress. Appearance: He is ill-appearing. He is not toxic-appearing. HENT:      Head: Normocephalic and atraumatic. Eyes:      General: No scleral icterus. Comments: Pupils 4 mm bilaterally. Non-reactive. Neck:      Musculoskeletal: Neck supple. Cardiovascular:      Comments: Pulseless  Pulmonary:      Effort: Respiratory distress present. Comments: Severely diminished bilaterally  Abdominal:      General: There is no distension. Palpations: Abdomen is soft. Musculoskeletal:      Comments: R tibial IO line in place   Skin:     General: Skin is dry. Findings: No rash (over exposed skin).    Neurological:      Comments: Unable to assess   Psychiatric:      Comments: Unable to assess       DIAGNOSTICS     PLAN (LABS / Larena Abiola / EKG):  Orders Placed This Encounter   Procedures    Hemoglobin and hematocrit, blood    SODIUM (POC)    POTASSIUM (POC)    CHLORIDE (POC)    CALCIUM, IONIC (POC)    Venous Blood Gas, POC    Creatinine W/GFR Point of Care    Lactic Acid, POC    POCT Glucose    Anion Gap (Calc) POC performed as patient lost pulses and the code was called. The patient tolerated the procedure. COMPLICATIONS:  None     Dennie Corporal, DO  7:44 AM, 3/5/20      CONSULTS:  None    FINAL IMPRESSION      1. Acute respiratory failure with hypoxia and hypercapnia ([facility]    2. Cardiac arrest ([facility]          DISPOSITION / PLAN     DISPOSITION  2020 07:39:42 AM      PATIENT REFERRED TO:  No follow-up provider specified.     DISCHARGE MEDICATIONS:  New Prescriptions    No medications on file       Dennie Corporal, DO  Emergency Medicine Resident  [facility]    (Please note that portions of this note were completed with a voice recognition program.  Efforts were made to edit thedictations but occasionally words are mis-transcribed.)      Dennie Corporal, DO  Resident  20 [phone]

## 2020-03-05 NOTE — CODE DOCUMENTATION
EMS intubation tube replaced with an 8.0 ET tube, positive color change, bilateral breath sounds with assisted ventilation via BVM

## 2020-03-05 NOTE — FLOWSHEET NOTE
[address] [facility] [address]   Patient Death Note  DEATH   Shift date: 3/5/20      Shift day: Thursday  Shift #: 1                 Room # 13/13   Name: Nory Mauricio            Age: 68 y.o. Gender: male          Christian: No [employer] on file        Place of Christian: Unknown  Admit Date & Time: 3/5/2020  7:14 AM       Referral: RN   Actual date of death: 3/5/20   TOD: 07:35am       SITUATION AT DEATH:  Patient arrived at ED via ground ambulance from his home in respiratory distress. CPR was done for a lengthy period of time and patient was finally pronounced at 7:35 am. No family members were present and patient was cleaned up and taken to the morgue to wait for the . IS THIS A 'S CASE? Yes    SPIRITUAL/EMOTIONAL INTERVENTION:     03/05/20 0946   Encounter Summary   Services provided to: Family   Support System Family members   Place of Church Unknown   Contact Synagogue No   Continue Visiting   (3/5/20)   Complexity of Encounter High   Length of Encounter 1 hour;30 minutes   Grief and Life Adjustment   Type End of life;Death   Assessment Grieving;Helplessness; Shock   Intervention Active listening;Explored feelings, thoughts, concerns;Nurtured hope;Sustaining presence/ Ministry of presence   Outcome Comfort;Expressed gratitude;Engaged in conversation; Tearful;Grieving;Venting emotion     I spoke with the patient's significant other, Mal Jeo, with whom he lived for about 10 years. I arranged for her to talk to Dr. Aydee Quinteros who informed her of the patient's death. I provided emotional support and comfort over the phone. She told me that she does not drive and that she needed to talk to her sons. She was grieving heavily. Mal Joe told me about the patient's daughter, Shanice Alexis, who lives in [facility]. She gave me the phone number and I called Giselle and arranged for her to talk to Dr. Aydee Quinteros who again told her about the patient's death.  I then provided emotional support for Giselle who was in shock and grieving openly. I explained next steps including the 's decision to do an autopsy to determine cause of death. Because of the sudden nature of this situation there is no  home determination. Later, the son of Margret Echavarria called the hospital and I talked with him and offered  about how to move forward and how to provide care for Arpit Ordonez at this time of sudden loss and grief. Family Received Grief Packet? No     NAME AND PHONE NUMBER OF DOCTOR SIGNING DEATH CERTIFICATE:      (phone)  [phone]    Copy of COMPLETED Release of Body Form Received? Yes     HOME:  Name:   City:   Phone Number:     NEXT OF KIN:  Name: Ms. Shelly Ramos  Relationship: Daughter  Street Address: [address] [place] [address]: [place]  Zip code: [address]   Phone Number: [phone]    ANY FOLLOW-UP NEEDED?   No      Electronically signed by Edilberto Ramirez on 3/5/2020 at 9:52 AM.  [facility] [place]  [phone]

## (undated) DEVICE — DRAPE MICSCP W117XL305CM DIA65MM LENS W VARI LENS2 FOR LEICA

## (undated) DEVICE — COVER,MAYO STAND,STERILE: Brand: MEDLINE

## (undated) DEVICE — PREP SOL PVP IODINE 4%  4 OZ/BTL

## (undated) DEVICE — SYRINGE BLB 2 PC STER 50

## (undated) DEVICE — PEN: MARKING STD 100/CS: Brand: MEDICAL ACTION INDUSTRIES

## (undated) DEVICE — ADHESIVE SKIN CLSR 0.7ML TOP DERMBND ADV

## (undated) DEVICE — THE MILL DISPOSABLE - MEDIUM

## (undated) DEVICE — BLADE ES ELASTOMERIC COAT INSUL DURABLE BEND UPTO 90DEG

## (undated) DEVICE — Device

## (undated) DEVICE — BIPOLAR SEALER 23-113-1 AQM 2.3 OM NEURO: Brand: AQUAMANTYS ®

## (undated) DEVICE — 3M™ TEGADERM™ TRANSPARENT FILM DRESSING FRAME STYLE, 1624W, 2-3/8 IN X 2-3/4 IN (6 CM X 7 CM), 100/CT 4CT/CASE: Brand: 3M™ TEGADERM™

## (undated) DEVICE — TRAY CATHETER 16FR F INCLUDE BARDX IC COMPLT CARE DRNGE BG

## (undated) DEVICE — CONVERTED USE 338908 SPONGES LAP 18X18 ST

## (undated) DEVICE — SHEET, T, LAPAROTOMY, STERILE: Brand: MEDLINE

## (undated) DEVICE — BLADE ES L4IN INSUL EDGE

## (undated) DEVICE — ELECTRODE PT RET AD L9FT HI MOIST COND ADH HYDRGEL CORDED

## (undated) DEVICE — SUTURE VCRL SZ 2-0 L18IN ABSRB VLT L26MM SH 1/2 CIR J775D

## (undated) DEVICE — FILTER CLP DISP FOR 5513E CLIPVAC

## (undated) DEVICE — DISCONTINUED USE 394504 SYRINGE LUER LOCK TIP 12 ML

## (undated) DEVICE — C-ARMOR C-ARM EQUIPMENT COVERS CLEAR STERILE UNIVERSAL FIT 12 PER CASE: Brand: C-ARMOR

## (undated) DEVICE — PACK PROCEDURE SURG LUMBAR SPINE SVMMC

## (undated) DEVICE — DIFFUSER: Brand: CORE, MAESTRO

## (undated) DEVICE — GOWN,AURORA,NONREINFORCED,LARGE: Brand: MEDLINE

## (undated) DEVICE — SUTURE ABSORBABLE BRAIDED 0 CT-1 8X27 IN UD VICRYL JJ41G

## (undated) DEVICE — 3.0MM PRECISION NEURO (MATCH HEAD)

## (undated) DEVICE — SUTURE MCRYL SZ 3-0 L18IN ABSRB UD L19MM PS-2 3/8 CIR PRIM Y497G

## (undated) DEVICE — CONTROL SYRINGE LUER-LOCK TIP: Brand: MONOJECT

## (undated) DEVICE — Z DISCONTINUED APPLICATOR SURG PREP 0.35OZ 2% CHG 70% ISO ALC W/ HI LT

## (undated) DEVICE — SUTURE VCRL SZ 2-0 L27IN ABSRB UD L36MM CT-1 1/2 CIR JJ42G

## (undated) DEVICE — SUTURE VCRL SZ 0 L18IN ABSRB VLT L36MM CT-1 1/2 CIR J740D

## (undated) DEVICE — NEURO SPONGES: Brand: DEROYAL

## (undated) DEVICE — 3M™ IOBAN™ 2 ANTIMICROBIAL INCISE DRAPE 6650EZ: Brand: IOBAN™ 2

## (undated) DEVICE — TOWEL SURG W16XL26IN WHT NONFENESTRATED ST 4 PER PK

## (undated) DEVICE — GLOVE SURG SZ 6 THK91MIL LTX FREE SYN POLYISOPRENE ANTI

## (undated) DEVICE — NEEDLE SPNL 22GA L3.5IN BLK HUB S STL REG WALL FIT STYL W/

## (undated) DEVICE — GLOVE ORANGE PI 8   MSG9080

## (undated) DEVICE — DRAPE,REIN 53X77,STERILE: Brand: MEDLINE

## (undated) DEVICE — CODMAN® SURGICAL PATTIES 1/2" X 3" (1.27CM X 7.62CM): Brand: CODMAN®

## (undated) DEVICE — DECANTER FLD 9IN ST BG FOR ASEP TRNSF OF FLD

## (undated) DEVICE — EMESIS BASIN: Brand: DEROYAL

## (undated) DEVICE — GAUZE,SPONGE,FLUFF,6"X6.75",STRL,5/TRAY: Brand: MEDLINE

## (undated) DEVICE — DRAPE C ARM UNIV W41XL74IN CLR PLAS XR VELC CLSR POLY STRP

## (undated) DEVICE — PROTECTOR ULN NRV PUR FOAM HK LOOP STRP ANATOMICALLY

## (undated) DEVICE — OIL CARTRIDGE: Brand: CORE, MAESTRO

## (undated) DEVICE — CODMAN® SURGICAL PATTIES 1/2" X 1/2" (1.27CM X 1.27CM): Brand: CODMAN®

## (undated) DEVICE — GLOVE SURG SZ 65 THK91MIL LTX FREE SYN POLYISOPRENE

## (undated) DEVICE — CODMAN® SURGICAL PATTIES 1" X 1" (2.54CM X 2.54CM): Brand: CODMAN®

## (undated) DEVICE — CONNECTOR TBNG WHT PLAS SUCT STR 5IN1 LTWT W/ M CONN